# Patient Record
Sex: FEMALE | Race: WHITE | NOT HISPANIC OR LATINO | Employment: UNEMPLOYED | URBAN - METROPOLITAN AREA
[De-identification: names, ages, dates, MRNs, and addresses within clinical notes are randomized per-mention and may not be internally consistent; named-entity substitution may affect disease eponyms.]

---

## 2017-02-22 ENCOUNTER — HOSPITAL ENCOUNTER (OUTPATIENT)
Dept: FAMILY MEDICINE CLINIC | Facility: CLINIC | Age: 42
Setting detail: SPECIMEN
Discharge: HOME | End: 2017-02-22
Attending: FAMILY MEDICINE | Admitting: FAMILY MEDICINE

## 2017-02-22 LAB
ALBUMIN SERPL-MCNC: 3.9 G/DL (ref 3.5–4.8)
ALBUMIN/GLOB SERPL: 1.4 {RATIO} (ref 1–1.7)
ALP SERPL-CCNC: 90 IU/L (ref 32–91)
ALT SERPL-CCNC: 19 IU/L (ref 14–54)
ANION GAP SERPL CALC-SCNC: 12.6 MMOL/L (ref 10–20)
AST SERPL-CCNC: 25 IU/L (ref 15–41)
BASOPHILS # BLD AUTO: 0.1 10*3/UL (ref 0–0.2)
BASOPHILS NFR BLD AUTO: 1 % (ref 0–2)
BILIRUB SERPL-MCNC: 0.9 MG/DL (ref 0.3–1.2)
BUN SERPL-MCNC: 5 MG/DL (ref 8–20)
BUN/CREAT SERPL: 5.6 (ref 5.4–26.2)
CALCIUM SERPL-MCNC: 9.2 MG/DL (ref 8.9–10.3)
CHLORIDE SERPL-SCNC: 106 MMOL/L (ref 101–111)
CHOLEST SERPL-MCNC: 196 MG/DL
CHOLEST/HDLC SERPL: 2.3 {RATIO}
CONV CO2: 26 MMOL/L (ref 22–32)
CONV LDL CHOLESTEROL DIRECT: 91 MG/DL (ref 0–100)
CONV TOTAL PROTEIN: 6.7 G/DL (ref 6.1–7.9)
CREAT UR-MCNC: 0.9 MG/DL (ref 0.4–1)
DIFFERENTIAL METHOD BLD: (no result)
EOSINOPHIL # BLD AUTO: 0.3 10*3/UL (ref 0–0.3)
EOSINOPHIL # BLD AUTO: 4 % (ref 0–3)
ERYTHROCYTE [DISTWIDTH] IN BLOOD BY AUTOMATED COUNT: 13.4 % (ref 11.5–14.5)
GLOBULIN UR ELPH-MCNC: 2.8 G/DL (ref 2.5–3.8)
GLUCOSE SERPL-MCNC: 89 MG/DL (ref 65–99)
HCT VFR BLD AUTO: 38.6 % (ref 35–49)
HDLC SERPL-MCNC: 87 MG/DL
HGB BLD-MCNC: 12.9 G/DL (ref 12–15)
LDLC/HDLC SERPL: 1 {RATIO}
LIPID INTERPRETATION: NORMAL
LYMPHOCYTES # BLD AUTO: 2.2 10*3/UL (ref 0.8–4.8)
LYMPHOCYTES NFR BLD AUTO: 32 % (ref 18–42)
MCH RBC QN AUTO: 29.8 PG (ref 26–32)
MCHC RBC AUTO-ENTMCNC: 33.4 G/DL (ref 32–36)
MCV RBC AUTO: 89.2 FL (ref 80–94)
MONOCYTES # BLD AUTO: 0.5 10*3/UL (ref 0.1–1.3)
MONOCYTES NFR BLD AUTO: 7 % (ref 2–11)
NEUTROPHILS # BLD AUTO: 3.8 10*3/UL (ref 2.3–8.6)
NEUTROPHILS NFR BLD AUTO: 56 % (ref 50–75)
NRBC BLD AUTO-RTO: 0 /100{WBCS}
NRBC/RBC NFR BLD MANUAL: 0 10*3/UL
PLATELET # BLD AUTO: 226 10*3/UL (ref 150–450)
PMV BLD AUTO: 8.3 FL (ref 7.4–10.4)
POTASSIUM SERPL-SCNC: 3.6 MMOL/L (ref 3.6–5.1)
RBC # BLD AUTO: 4.33 10*6/UL (ref 4–5.4)
SODIUM SERPL-SCNC: 141 MMOL/L (ref 136–144)
TRIGL SERPL-MCNC: 36 MG/DL
TSH SERPL-ACNC: 1.42 UIU/ML (ref 0.34–5.6)
VLDLC SERPL CALC-MCNC: 17.7 MG/DL
WBC # BLD AUTO: 6.8 10*3/UL (ref 4.5–11.5)

## 2017-02-24 LAB
ANTI-CARDIOLIPIN IGG ANTIBODY: <14 GPL
ANTI-CARDIOLIPIN IGM ANTIBODY: <12 MPL

## 2017-09-06 ENCOUNTER — HOSPITAL ENCOUNTER (OUTPATIENT)
Dept: FAMILY MEDICINE CLINIC | Facility: CLINIC | Age: 42
Setting detail: SPECIMEN
Discharge: HOME | End: 2017-09-06
Attending: FAMILY MEDICINE | Admitting: FAMILY MEDICINE

## 2019-06-20 DIAGNOSIS — F41.9 ANXIETY: Primary | ICD-10-CM

## 2019-06-20 RX ORDER — ESCITALOPRAM OXALATE 20 MG/1
TABLET ORAL EVERY 24 HOURS
COMMUNITY
Start: 2017-11-20 | End: 2019-06-20 | Stop reason: SDUPTHER

## 2019-06-20 RX ORDER — ESCITALOPRAM OXALATE 20 MG/1
20 TABLET ORAL DAILY
Qty: 90 TABLET | Refills: 2 | Status: SHIPPED | OUTPATIENT
Start: 2019-06-20 | End: 2020-03-16

## 2020-03-16 DIAGNOSIS — F41.9 ANXIETY: ICD-10-CM

## 2020-03-16 RX ORDER — ESCITALOPRAM OXALATE 20 MG/1
TABLET ORAL
Qty: 90 TABLET | Refills: 1 | Status: SHIPPED | OUTPATIENT
Start: 2020-03-16 | End: 2020-09-14 | Stop reason: SDUPTHER

## 2020-09-14 DIAGNOSIS — F41.9 ANXIETY: ICD-10-CM

## 2020-09-14 RX ORDER — ESCITALOPRAM OXALATE 20 MG/1
20 TABLET ORAL DAILY
Qty: 30 TABLET | Refills: 1 | Status: SHIPPED | OUTPATIENT
Start: 2020-09-14 | End: 2021-01-15 | Stop reason: SDUPTHER

## 2020-11-10 ENCOUNTER — OFFICE VISIT (OUTPATIENT)
Dept: FAMILY MEDICINE CLINIC | Facility: CLINIC | Age: 45
End: 2020-11-10

## 2020-11-10 VITALS
OXYGEN SATURATION: 99 % | SYSTOLIC BLOOD PRESSURE: 129 MMHG | HEART RATE: 77 BPM | BODY MASS INDEX: 32.08 KG/M2 | DIASTOLIC BLOOD PRESSURE: 88 MMHG | TEMPERATURE: 97.7 F | WEIGHT: 167 LBS

## 2020-11-10 DIAGNOSIS — F41.9 ANXIETY: Primary | ICD-10-CM

## 2020-11-10 DIAGNOSIS — Z23 IMMUNIZATION DUE: ICD-10-CM

## 2020-11-10 PROCEDURE — 90686 IIV4 VACC NO PRSV 0.5 ML IM: CPT | Performed by: FAMILY MEDICINE

## 2020-11-10 PROCEDURE — 90471 IMMUNIZATION ADMIN: CPT | Performed by: FAMILY MEDICINE

## 2020-11-10 PROCEDURE — 99213 OFFICE O/P EST LOW 20 MIN: CPT | Performed by: FAMILY MEDICINE

## 2020-11-10 NOTE — PROGRESS NOTES
Subjective   Chika Valente is a 45 y.o. female.     She is in today for follow-up on anxiety.  She has been on Lexapro for quite some time and has been overdue for a follow-up.  She has remained stable on current treatment plan.  She feels she is in a good place, but she is not ready to lower her dose or try coming off the medication.  She feels she has her anxiety under good control.  She denies any suicidal ideations.  She denies any issue with chest pain or palpitations.  She denies any change in bowel or bladder function.  She denies any issue with sleep.  She says she is functional with all ADLs.  She has gained a little weight but nothing excessive of late.         /88 (BP Location: Left arm, Patient Position: Sitting, Cuff Size: Large Adult)   Pulse 77   Temp 97.7 °F (36.5 °C) (Temporal)   Wt 75.8 kg (167 lb)   SpO2 99%   BMI 32.08 kg/m²       Chief Complaint   Patient presents with   • Med Refill     last seen 03/2019           Current Outpatient Medications:   •  escitalopram (LEXAPRO) 20 MG tablet, Take 1 tablet by mouth Daily., Disp: 30 tablet, Rfl: 1        The following portions of the patient's history were reviewed and updated as appropriate: allergies, current medications, past family history, past medical history, past social history, past surgical history and problem list.    Review of Systems   Constitutional: Negative for activity change, fatigue and fever.   HENT: Negative for congestion, sinus pressure, sinus pain, sore throat and trouble swallowing.    Eyes: Negative for visual disturbance.   Respiratory: Negative for chest tightness, shortness of breath and wheezing.    Cardiovascular: Negative for chest pain.   Gastrointestinal: Negative for abdominal distention, abdominal pain, constipation, diarrhea, nausea and vomiting.   Genitourinary: Negative for difficulty urinating, dysuria, frequency and urgency.   Musculoskeletal: Negative for back pain and neck pain.   Neurological:  Negative for dizziness, weakness, light-headedness and numbness.   Psychiatric/Behavioral: Negative for agitation, dysphoric mood, hallucinations, sleep disturbance and suicidal ideas.       Objective   Physical Exam  Vitals signs and nursing note reviewed.   Neck:      Musculoskeletal: Neck supple.   Cardiovascular:      Rate and Rhythm: Normal rate and regular rhythm.      Heart sounds: Normal heart sounds. No murmur.   Pulmonary:      Effort: Pulmonary effort is normal.      Breath sounds: Normal breath sounds. No wheezing or rales.   Abdominal:      General: Bowel sounds are normal.      Palpations: Abdomen is soft.      Tenderness: There is no abdominal tenderness. There is no guarding.   Lymphadenopathy:      Cervical: No cervical adenopathy.   Neurological:      General: No focal deficit present.      Mental Status: She is alert and oriented to person, place, and time.   Psychiatric:         Mood and Affect: Mood normal.           Assessment/Plan   Problems Addressed this Visit     None      Visit Diagnoses     Anxiety    -  Primary      Diagnoses       Codes Comments    Anxiety    -  Primary ICD-10-CM: F41.9  ICD-9-CM: 300.00         I will continue her current treatment  I will update flu vaccine  I will see her back as needed

## 2021-01-15 DIAGNOSIS — F41.9 ANXIETY: ICD-10-CM

## 2021-01-15 RX ORDER — ESCITALOPRAM OXALATE 20 MG/1
20 TABLET ORAL DAILY
Qty: 30 TABLET | Refills: 3 | Status: SHIPPED | OUTPATIENT
Start: 2021-01-15 | End: 2021-01-20 | Stop reason: SDUPTHER

## 2021-01-20 ENCOUNTER — TELEPHONE (OUTPATIENT)
Dept: FAMILY MEDICINE CLINIC | Facility: CLINIC | Age: 46
End: 2021-01-20

## 2021-01-20 DIAGNOSIS — F41.9 ANXIETY: ICD-10-CM

## 2021-01-20 RX ORDER — ESCITALOPRAM OXALATE 20 MG/1
20 TABLET ORAL DAILY
Qty: 10 TABLET | Refills: 0 | Status: SHIPPED | OUTPATIENT
Start: 2021-01-20 | End: 2021-06-23

## 2021-06-23 DIAGNOSIS — F41.9 ANXIETY: ICD-10-CM

## 2021-06-23 RX ORDER — ESCITALOPRAM OXALATE 20 MG/1
TABLET ORAL
Qty: 30 TABLET | Refills: 3 | Status: SHIPPED | OUTPATIENT
Start: 2021-06-23 | End: 2021-06-24 | Stop reason: SDUPTHER

## 2021-06-24 ENCOUNTER — TELEPHONE (OUTPATIENT)
Dept: FAMILY MEDICINE CLINIC | Facility: CLINIC | Age: 46
End: 2021-06-24

## 2021-06-24 DIAGNOSIS — F41.9 ANXIETY: ICD-10-CM

## 2021-06-24 RX ORDER — ESCITALOPRAM OXALATE 20 MG/1
20 TABLET ORAL DAILY
Qty: 30 TABLET | Refills: 0 | Status: SHIPPED | OUTPATIENT
Start: 2021-06-24 | End: 2021-07-17

## 2021-06-24 NOTE — TELEPHONE ENCOUNTER
Caller: Ambrosio Chika A    Relationship: Self    Best call back number:[phone] (H)    Medication needed:   Requested Prescriptions     Pending Prescriptions Disp Refills   • escitalopram (LEXAPRO) 20 MG tablet 30 tablet 3     Sig: Take 1 tablet by mouth Daily.       When do you need the refill by: AS SOON AS POSSIBLE    What additional details did the patient provide when requesting the medication: PATIENT STATES SHE NEEDS A WEEK SUPPLY OF HER MEDICATION. STATES SHE WAITED TO LONG TO CALL IT INTO [facility], AND IS COMPLETLEY OUT.     Does the patient have less than a 3 day supply:  [x] Yes  [] No    What is the patient's preferred pharmacy: [facility] [address] [address] - [phone] [facility] [phone]

## 2021-07-17 DIAGNOSIS — F41.9 ANXIETY: ICD-10-CM

## 2021-07-17 RX ORDER — ESCITALOPRAM OXALATE 20 MG/1
TABLET ORAL
Qty: 30 TABLET | Refills: 0 | Status: SHIPPED | OUTPATIENT
Start: 2021-07-17 | End: 2021-08-09 | Stop reason: SDUPTHER

## 2021-08-09 DIAGNOSIS — F41.9 ANXIETY: ICD-10-CM

## 2021-08-09 RX ORDER — ESCITALOPRAM OXALATE 20 MG/1
20 TABLET ORAL DAILY
Qty: 90 TABLET | Refills: 0 | Status: SHIPPED | OUTPATIENT
Start: 2021-08-09 | End: 2021-11-15 | Stop reason: SDUPTHER

## 2021-11-15 DIAGNOSIS — F41.9 ANXIETY: ICD-10-CM

## 2021-11-15 RX ORDER — ESCITALOPRAM OXALATE 20 MG/1
20 TABLET ORAL DAILY
Qty: 90 TABLET | Refills: 0 | Status: SHIPPED | OUTPATIENT
Start: 2021-11-15 | End: 2022-05-11

## 2021-11-15 NOTE — TELEPHONE ENCOUNTER
Caller: Chika Valente    Relationship: Self    Best call back number: [phone] (H)    Requested Prescriptions:   Requested Prescriptions     Pending Prescriptions Disp Refills   • escitalopram (LEXAPRO) 20 MG tablet 90 tablet 0     Sig: Take 1 tablet by mouth Daily.        Pharmacy where request should be sent:  [facility] - [address] - [phone] [facility] [phone] [phone]    Additional details provided by patient: PATIENT HAS 10 DAYS LEFT     Does the patient have less than a 3 day supply:  [] Yes  [x] No    Joanna Alas, PCT   11/15/21 13:36 EST

## 2022-05-11 DIAGNOSIS — F41.9 ANXIETY: ICD-10-CM

## 2022-05-11 RX ORDER — ESCITALOPRAM OXALATE 20 MG/1
TABLET ORAL
Qty: 90 TABLET | Refills: 0 | Status: SHIPPED | OUTPATIENT
Start: 2022-05-11 | End: 2022-06-06 | Stop reason: SDUPTHER

## 2022-06-06 ENCOUNTER — LAB (OUTPATIENT)
Dept: FAMILY MEDICINE CLINIC | Facility: CLINIC | Age: 47
End: 2022-06-06

## 2022-06-06 ENCOUNTER — OFFICE VISIT (OUTPATIENT)
Dept: FAMILY MEDICINE CLINIC | Facility: CLINIC | Age: 47
End: 2022-06-06

## 2022-06-06 VITALS
TEMPERATURE: 97.7 F | DIASTOLIC BLOOD PRESSURE: 92 MMHG | OXYGEN SATURATION: 100 % | HEIGHT: 61 IN | WEIGHT: 161 LBS | SYSTOLIC BLOOD PRESSURE: 144 MMHG | RESPIRATION RATE: 16 BRPM | HEART RATE: 72 BPM | BODY MASS INDEX: 30.4 KG/M2

## 2022-06-06 DIAGNOSIS — F41.9 ANXIETY: Primary | ICD-10-CM

## 2022-06-06 DIAGNOSIS — Z12.11 ENCOUNTER FOR SCREENING COLONOSCOPY: ICD-10-CM

## 2022-06-06 DIAGNOSIS — Z13.1 SCREENING FOR DIABETES MELLITUS: ICD-10-CM

## 2022-06-06 DIAGNOSIS — Z13.220 SCREENING CHOLESTEROL LEVEL: ICD-10-CM

## 2022-06-06 PROBLEM — K21.9 GASTROESOPHAGEAL REFLUX DISEASE: Status: ACTIVE | Noted: 2019-03-20

## 2022-06-06 PROBLEM — E78.5 DYSLIPIDEMIA: Status: ACTIVE | Noted: 2017-09-06

## 2022-06-06 LAB
ALBUMIN SERPL-MCNC: 4 G/DL (ref 3.5–5.2)
ALBUMIN/GLOB SERPL: 1.3 G/DL
ALP SERPL-CCNC: 93 U/L (ref 39–117)
ALT SERPL W P-5'-P-CCNC: 8 U/L (ref 1–33)
ANION GAP SERPL CALCULATED.3IONS-SCNC: 10.4 MMOL/L (ref 5–15)
AST SERPL-CCNC: 12 U/L (ref 1–32)
BASOPHILS # BLD AUTO: 0.08 10*3/MM3 (ref 0–0.2)
BASOPHILS NFR BLD AUTO: 1.3 % (ref 0–1.5)
BILIRUB SERPL-MCNC: 0.5 MG/DL (ref 0–1.2)
BUN SERPL-MCNC: 12 MG/DL (ref 6–20)
BUN/CREAT SERPL: 15.8 (ref 7–25)
CALCIUM SPEC-SCNC: 9 MG/DL (ref 8.6–10.5)
CHLORIDE SERPL-SCNC: 105 MMOL/L (ref 98–107)
CHOLEST SERPL-MCNC: 192 MG/DL (ref 0–200)
CO2 SERPL-SCNC: 25.6 MMOL/L (ref 22–29)
CREAT SERPL-MCNC: 0.76 MG/DL (ref 0.57–1)
DEPRECATED RDW RBC AUTO: 41.4 FL (ref 37–54)
EGFRCR SERPLBLD CKD-EPI 2021: 98 ML/MIN/1.73
EOSINOPHIL # BLD AUTO: 0.26 10*3/MM3 (ref 0–0.4)
EOSINOPHIL NFR BLD AUTO: 4.3 % (ref 0.3–6.2)
ERYTHROCYTE [DISTWIDTH] IN BLOOD BY AUTOMATED COUNT: 12.7 % (ref 12.3–15.4)
GLOBULIN UR ELPH-MCNC: 3.2 GM/DL
GLUCOSE SERPL-MCNC: 100 MG/DL (ref 65–99)
HCT VFR BLD AUTO: 37.8 % (ref 34–46.6)
HDLC SERPL-MCNC: 83 MG/DL (ref 40–60)
HGB BLD-MCNC: 12.1 G/DL (ref 12–15.9)
IMM GRANULOCYTES # BLD AUTO: 0.01 10*3/MM3 (ref 0–0.05)
IMM GRANULOCYTES NFR BLD AUTO: 0.2 % (ref 0–0.5)
LDLC SERPL CALC-MCNC: 103 MG/DL (ref 0–100)
LDLC/HDLC SERPL: 1.25 {RATIO}
LYMPHOCYTES # BLD AUTO: 1.8 10*3/MM3 (ref 0.7–3.1)
LYMPHOCYTES NFR BLD AUTO: 30.1 % (ref 19.6–45.3)
MCH RBC QN AUTO: 28.7 PG (ref 26.6–33)
MCHC RBC AUTO-ENTMCNC: 32 G/DL (ref 31.5–35.7)
MCV RBC AUTO: 89.8 FL (ref 79–97)
MONOCYTES # BLD AUTO: 0.41 10*3/MM3 (ref 0.1–0.9)
MONOCYTES NFR BLD AUTO: 6.8 % (ref 5–12)
NEUTROPHILS NFR BLD AUTO: 3.43 10*3/MM3 (ref 1.7–7)
NEUTROPHILS NFR BLD AUTO: 57.3 % (ref 42.7–76)
NRBC BLD AUTO-RTO: 0 /100 WBC (ref 0–0.2)
PLATELET # BLD AUTO: 232 10*3/MM3 (ref 140–450)
PMV BLD AUTO: 9.9 FL (ref 6–12)
POTASSIUM SERPL-SCNC: 4.3 MMOL/L (ref 3.5–5.2)
PROT SERPL-MCNC: 7.2 G/DL (ref 6–8.5)
RBC # BLD AUTO: 4.21 10*6/MM3 (ref 3.77–5.28)
SODIUM SERPL-SCNC: 141 MMOL/L (ref 136–145)
TRIGL SERPL-MCNC: 25 MG/DL (ref 0–150)
TSH SERPL DL<=0.05 MIU/L-ACNC: 2.11 UIU/ML (ref 0.27–4.2)
VLDLC SERPL-MCNC: 6 MG/DL (ref 5–40)
WBC NRBC COR # BLD: 5.99 10*3/MM3 (ref 3.4–10.8)

## 2022-06-06 PROCEDURE — 36415 COLL VENOUS BLD VENIPUNCTURE: CPT | Performed by: PHYSICIAN ASSISTANT

## 2022-06-06 PROCEDURE — 80050 GENERAL HEALTH PANEL: CPT | Performed by: PHYSICIAN ASSISTANT

## 2022-06-06 PROCEDURE — 80061 LIPID PANEL: CPT | Performed by: PHYSICIAN ASSISTANT

## 2022-06-06 PROCEDURE — 99213 OFFICE O/P EST LOW 20 MIN: CPT | Performed by: PHYSICIAN ASSISTANT

## 2022-06-06 RX ORDER — ESCITALOPRAM OXALATE 20 MG/1
20 TABLET ORAL DAILY
Qty: 90 TABLET | Refills: 3 | Status: SHIPPED | OUTPATIENT
Start: 2022-06-06

## 2022-06-06 NOTE — PROGRESS NOTES
Subjective   Chika Valente is a 47 y.o. female.     Pt presents to follow up on her anxiety and for refill of her Lexapro.  She is getting annual mammograms and last one was in February.  Dr. Butler is ob/gyn.         The following portions of the patient's history were reviewed and updated as appropriate: allergies, current medications, past family history, past medical history, past social history, past surgical history and problem list.  Past Medical History:   Diagnosis Date   • Anxiety      No past surgical history on file.  No family history on file.  Social History     Socioeconomic History   • Marital status:    Tobacco Use   • Smoking status: Never Smoker   • Smokeless tobacco: Never Used   Substance and Sexual Activity   • Alcohol use: Yes   • Drug use: Never   • Sexual activity: Defer         Current Outpatient Medications:   •  escitalopram (LEXAPRO) 20 MG tablet, Take 1 tablet by mouth Daily., Disp: 90 tablet, Rfl: 3    Review of Systems   Constitutional: Negative for activity change, appetite change, chills, diaphoresis, fatigue, fever, unexpected weight gain and unexpected weight loss.   Eyes: Negative for blurred vision, double vision and visual disturbance.   Respiratory: Negative for chest tightness and shortness of breath.    Cardiovascular: Negative for chest pain, palpitations and leg swelling.   Gastrointestinal: Negative for abdominal pain, constipation, diarrhea, nausea and vomiting.   Genitourinary: Negative for menstrual problem.   Musculoskeletal: Positive for arthralgias. Negative for gait problem.   Neurological: Negative for dizziness, tremors, syncope, weakness, light-headedness and confusion.   Psychiatric/Behavioral: Positive for stress. Negative for decreased concentration, dysphoric mood, sleep disturbance and depressed mood. The patient is not nervous/anxious.      /92 (BP Location: Left arm, Patient Position: Sitting, Cuff Size: Adult)   Pulse 72   Temp 97.7 °F  "(36.5 °C) (Temporal)   Resp 16   Ht 153.7 cm (60.5\")   Wt 73 kg (161 lb)   LMP 05/30/2022 (Exact Date)   SpO2 100%   Breastfeeding No   BMI 30.93 kg/m²       Objective   Physical Exam  Vitals and nursing note reviewed.   Constitutional:       Appearance: Normal appearance. She is normal weight.   Neck:      Thyroid: No thyroid mass, thyromegaly or thyroid tenderness.      Vascular: No carotid bruit.   Cardiovascular:      Rate and Rhythm: Normal rate and regular rhythm.      Heart sounds: Normal heart sounds.   Pulmonary:      Effort: Pulmonary effort is normal.      Breath sounds: Normal breath sounds.   Musculoskeletal:         General: Normal range of motion.      Cervical back: Normal range of motion and neck supple.      Right lower leg: No edema.      Left lower leg: No edema.   Skin:     General: Skin is warm and dry.   Neurological:      General: No focal deficit present.      Mental Status: She is alert and oriented to person, place, and time.   Psychiatric:         Mood and Affect: Mood normal.         Behavior: Behavior normal.         Thought Content: Thought content normal.         Procedures       Diagnoses and all orders for this visit:    1. Anxiety (Primary)  Comments:  Pt doing well with current regimen.  Lexapro refilled today.  Orders:  -     CBC & Differential  -     escitalopram (LEXAPRO) 20 MG tablet; Take 1 tablet by mouth Daily.  Dispense: 90 tablet; Refill: 3  -     TSH    2. Screening cholesterol level  Comments:  Pt to get fasting labs done today and will call with results.  Orders:  -     Lipid Panel    3. Screening for diabetes mellitus  Comments:  Pt to get fasting labs done today and will call with results.  Orders:  -     Comprehensive Metabolic Panel    4. Encounter for screening colonoscopy  Comments:  Pt to schedule her colonoscopy.  Orders:  -     Ambulatory Referral For Screening Colonoscopy               "

## 2023-07-25 DIAGNOSIS — F41.9 ANXIETY: ICD-10-CM

## 2023-07-25 RX ORDER — ESCITALOPRAM OXALATE 20 MG/1
TABLET ORAL
Qty: 90 TABLET | Refills: 0 | Status: SHIPPED | OUTPATIENT
Start: 2023-07-25

## 2023-10-23 DIAGNOSIS — F41.9 ANXIETY: ICD-10-CM

## 2023-10-23 RX ORDER — ESCITALOPRAM OXALATE 20 MG/1
TABLET ORAL
Qty: 90 TABLET | Refills: 0 | Status: SHIPPED | OUTPATIENT
Start: 2023-10-23

## 2024-01-22 DIAGNOSIS — F41.9 ANXIETY: ICD-10-CM

## 2024-01-22 RX ORDER — ESCITALOPRAM OXALATE 20 MG/1
TABLET ORAL
Qty: 90 TABLET | Refills: 0 | Status: SHIPPED | OUTPATIENT
Start: 2024-01-22

## 2024-03-22 ENCOUNTER — OFFICE VISIT (OUTPATIENT)
Dept: FAMILY MEDICINE CLINIC | Facility: CLINIC | Age: 49
End: 2024-03-22
Payer: COMMERCIAL

## 2024-03-22 VITALS
HEART RATE: 81 BPM | RESPIRATION RATE: 18 BRPM | WEIGHT: 186.2 LBS | OXYGEN SATURATION: 99 % | TEMPERATURE: 98.4 F | BODY MASS INDEX: 34.27 KG/M2 | SYSTOLIC BLOOD PRESSURE: 126 MMHG | DIASTOLIC BLOOD PRESSURE: 88 MMHG | HEIGHT: 62 IN

## 2024-03-22 DIAGNOSIS — K21.9 GASTROESOPHAGEAL REFLUX DISEASE WITHOUT ESOPHAGITIS: Primary | ICD-10-CM

## 2024-03-22 DIAGNOSIS — Z76.89 ENCOUNTER TO ESTABLISH CARE: ICD-10-CM

## 2024-03-22 DIAGNOSIS — R06.2 WHEEZING: ICD-10-CM

## 2024-03-22 DIAGNOSIS — F41.1 GENERALIZED ANXIETY DISORDER: ICD-10-CM

## 2024-03-22 DIAGNOSIS — Z12.11 ENCOUNTER FOR SCREENING COLONOSCOPY: ICD-10-CM

## 2024-03-22 RX ORDER — OMEPRAZOLE 20 MG/1
20 CAPSULE, DELAYED RELEASE ORAL DAILY
COMMUNITY
End: 2024-03-22

## 2024-03-22 RX ORDER — OMEPRAZOLE 20 MG/1
20 CAPSULE, DELAYED RELEASE ORAL DAILY
Qty: 90 CAPSULE | Refills: 1 | Status: SHIPPED | OUTPATIENT
Start: 2024-03-22

## 2024-03-22 RX ORDER — MONTELUKAST SODIUM 10 MG/1
10 TABLET ORAL NIGHTLY
Qty: 90 TABLET | Refills: 0 | Status: SHIPPED | OUTPATIENT
Start: 2024-03-22

## 2024-03-22 RX ORDER — ALBUTEROL SULFATE 90 UG/1
2 AEROSOL, METERED RESPIRATORY (INHALATION) EVERY 4 HOURS PRN
Qty: 18 G | Refills: 2 | Status: SHIPPED | OUTPATIENT
Start: 2024-03-22

## 2024-03-22 RX ORDER — LORATADINE 10 MG/1
10 CAPSULE, LIQUID FILLED ORAL DAILY
COMMUNITY
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
## 2024-04-22 DIAGNOSIS — F41.9 ANXIETY: ICD-10-CM

## 2024-04-22 RX ORDER — ESCITALOPRAM OXALATE 20 MG/1
TABLET ORAL
Qty: 90 TABLET | Refills: 3 | OUTPATIENT
Start: 2024-04-22

## 2024-04-26 ENCOUNTER — CLINICAL SUPPORT (OUTPATIENT)
Dept: FAMILY MEDICINE CLINIC | Facility: CLINIC | Age: 49
End: 2024-04-26
Payer: COMMERCIAL

## 2024-04-26 ENCOUNTER — PREP FOR SURGERY (OUTPATIENT)
Dept: OTHER | Facility: HOSPITAL | Age: 49
End: 2024-04-26

## 2024-04-26 DIAGNOSIS — Z12.11 ENCOUNTER FOR SCREENING COLONOSCOPY: Primary | ICD-10-CM

## 2024-04-26 DIAGNOSIS — Z76.89 ENCOUNTER TO ESTABLISH CARE: ICD-10-CM

## 2024-04-26 LAB — TSH SERPL DL<=0.05 MIU/L-ACNC: 3.62 UIU/ML (ref 0.27–4.2)

## 2024-04-26 PROCEDURE — 80061 LIPID PANEL: CPT | Performed by: NURSE PRACTITIONER

## 2024-04-26 PROCEDURE — 83036 HEMOGLOBIN GLYCOSYLATED A1C: CPT | Performed by: NURSE PRACTITIONER

## 2024-04-26 PROCEDURE — 81001 URINALYSIS AUTO W/SCOPE: CPT | Performed by: NURSE PRACTITIONER

## 2024-04-26 PROCEDURE — 80050 GENERAL HEALTH PANEL: CPT | Performed by: NURSE PRACTITIONER

## 2024-04-26 NOTE — PROGRESS NOTES
Venipuncture Blood Specimen Collection  Venipuncture performed in the right arm by Brenda Hodgson MA with good hemostasis. Patient tolerated the procedure well without complications.   04/26/24   Brenda Hodgson MA

## 2024-04-27 LAB
ALBUMIN SERPL-MCNC: 4.2 G/DL (ref 3.5–5.2)
ALBUMIN/GLOB SERPL: 1.4 G/DL
ALP SERPL-CCNC: 109 U/L (ref 39–117)
ALT SERPL W P-5'-P-CCNC: 12 U/L (ref 1–33)
ANION GAP SERPL CALCULATED.3IONS-SCNC: 9 MMOL/L (ref 5–15)
AST SERPL-CCNC: 12 U/L (ref 1–32)
BACTERIA UR QL AUTO: ABNORMAL /HPF
BASOPHILS # BLD AUTO: 0.08 10*3/MM3 (ref 0–0.2)
BASOPHILS NFR BLD AUTO: 1 % (ref 0–1.5)
BILIRUB SERPL-MCNC: 0.5 MG/DL (ref 0–1.2)
BILIRUB UR QL STRIP: NEGATIVE
BUN SERPL-MCNC: 9 MG/DL (ref 6–20)
BUN/CREAT SERPL: 10.8 (ref 7–25)
CALCIUM SPEC-SCNC: 9 MG/DL (ref 8.6–10.5)
CHLORIDE SERPL-SCNC: 105 MMOL/L (ref 98–107)
CHOLEST SERPL-MCNC: 189 MG/DL (ref 0–200)
CLARITY UR: CLEAR
CO2 SERPL-SCNC: 26 MMOL/L (ref 22–29)
COLOR UR: YELLOW
CREAT SERPL-MCNC: 0.83 MG/DL (ref 0.57–1)
DEPRECATED RDW RBC AUTO: 43.4 FL (ref 37–54)
EGFRCR SERPLBLD CKD-EPI 2021: 87.1 ML/MIN/1.73
EOSINOPHIL # BLD AUTO: 0.22 10*3/MM3 (ref 0–0.4)
EOSINOPHIL NFR BLD AUTO: 2.8 % (ref 0.3–6.2)
ERYTHROCYTE [DISTWIDTH] IN BLOOD BY AUTOMATED COUNT: 15.3 % (ref 12.3–15.4)
GLOBULIN UR ELPH-MCNC: 3.1 GM/DL
GLUCOSE SERPL-MCNC: 92 MG/DL (ref 65–99)
GLUCOSE UR STRIP-MCNC: NEGATIVE MG/DL
HBA1C MFR BLD: 5.1 % (ref 4.8–5.6)
HCT VFR BLD AUTO: 35.4 % (ref 34–46.6)
HDLC SERPL-MCNC: 67 MG/DL (ref 40–60)
HGB BLD-MCNC: 10.7 G/DL (ref 12–15.9)
HGB UR QL STRIP.AUTO: NEGATIVE
HOLD SPECIMEN: NORMAL
HYALINE CASTS UR QL AUTO: ABNORMAL /LPF
IMM GRANULOCYTES # BLD AUTO: 0.02 10*3/MM3 (ref 0–0.05)
IMM GRANULOCYTES NFR BLD AUTO: 0.3 % (ref 0–0.5)
KETONES UR QL STRIP: NEGATIVE
LDLC SERPL CALC-MCNC: 114 MG/DL (ref 0–100)
LDLC/HDLC SERPL: 1.7 {RATIO}
LEUKOCYTE ESTERASE UR QL STRIP.AUTO: ABNORMAL
LYMPHOCYTES # BLD AUTO: 2.62 10*3/MM3 (ref 0.7–3.1)
LYMPHOCYTES NFR BLD AUTO: 33.5 % (ref 19.6–45.3)
MCH RBC QN AUTO: 23.9 PG (ref 26.6–33)
MCHC RBC AUTO-ENTMCNC: 30.2 G/DL (ref 31.5–35.7)
MCV RBC AUTO: 79 FL (ref 79–97)
MONOCYTES # BLD AUTO: 0.55 10*3/MM3 (ref 0.1–0.9)
MONOCYTES NFR BLD AUTO: 7 % (ref 5–12)
NEUTROPHILS NFR BLD AUTO: 4.33 10*3/MM3 (ref 1.7–7)
NEUTROPHILS NFR BLD AUTO: 55.4 % (ref 42.7–76)
NITRITE UR QL STRIP: NEGATIVE
NRBC BLD AUTO-RTO: 0 /100 WBC (ref 0–0.2)
PH UR STRIP.AUTO: 6 [PH] (ref 5–8)
PLATELET # BLD AUTO: 342 10*3/MM3 (ref 140–450)
PMV BLD AUTO: 10.3 FL (ref 6–12)
POTASSIUM SERPL-SCNC: 4.3 MMOL/L (ref 3.5–5.2)
PROT SERPL-MCNC: 7.3 G/DL (ref 6–8.5)
PROT UR QL STRIP: ABNORMAL
RBC # BLD AUTO: 4.48 10*6/MM3 (ref 3.77–5.28)
RBC # UR STRIP: ABNORMAL /HPF
REF LAB TEST METHOD: ABNORMAL
SODIUM SERPL-SCNC: 140 MMOL/L (ref 136–145)
SP GR UR STRIP: 1.02 (ref 1–1.03)
SQUAMOUS #/AREA URNS HPF: ABNORMAL /HPF
TRIGL SERPL-MCNC: 42 MG/DL (ref 0–150)
UROBILINOGEN UR QL STRIP: ABNORMAL
VLDLC SERPL-MCNC: 8 MG/DL (ref 5–40)
WBC # UR STRIP: ABNORMAL /HPF
WBC NRBC COR # BLD AUTO: 7.82 10*3/MM3 (ref 3.4–10.8)
YEAST URNS QL MICRO: PRESENT /HPF

## 2024-04-27 RX ORDER — SODIUM CHLORIDE, SODIUM LACTATE, POTASSIUM CHLORIDE, CALCIUM CHLORIDE 600; 310; 30; 20 MG/100ML; MG/100ML; MG/100ML; MG/100ML
30 INJECTION, SOLUTION INTRAVENOUS CONTINUOUS
OUTPATIENT
Start: 2024-04-27

## 2024-04-27 RX ORDER — SODIUM, POTASSIUM,MAG SULFATES 17.5-3.13G
SOLUTION, RECONSTITUTED, ORAL ORAL
Qty: 177 ML | Refills: 0 | Status: SHIPPED | OUTPATIENT
Start: 2024-04-27 | End: 2024-05-08 | Stop reason: SDUPTHER

## 2024-05-03 ENCOUNTER — OFFICE VISIT (OUTPATIENT)
Dept: FAMILY MEDICINE CLINIC | Facility: CLINIC | Age: 49
End: 2024-05-03
Payer: COMMERCIAL

## 2024-05-03 ENCOUNTER — TELEPHONE (OUTPATIENT)
Dept: FAMILY MEDICINE CLINIC | Facility: CLINIC | Age: 49
End: 2024-05-03
Payer: COMMERCIAL

## 2024-05-03 VITALS
DIASTOLIC BLOOD PRESSURE: 80 MMHG | SYSTOLIC BLOOD PRESSURE: 140 MMHG | HEIGHT: 62 IN | WEIGHT: 187 LBS | RESPIRATION RATE: 18 BRPM | OXYGEN SATURATION: 96 % | TEMPERATURE: 97.1 F | HEART RATE: 74 BPM | BODY MASS INDEX: 34.41 KG/M2

## 2024-05-03 DIAGNOSIS — J45.20 MILD INTERMITTENT ASTHMA WITHOUT COMPLICATION: ICD-10-CM

## 2024-05-03 DIAGNOSIS — F41.1 GENERALIZED ANXIETY DISORDER: Primary | ICD-10-CM

## 2024-05-03 DIAGNOSIS — R03.0 ELEVATED BLOOD PRESSURE READING IN OFFICE WITHOUT DIAGNOSIS OF HYPERTENSION: ICD-10-CM

## 2024-05-03 PROCEDURE — 99214 OFFICE O/P EST MOD 30 MIN: CPT | Performed by: NURSE PRACTITIONER

## 2024-05-03 RX ORDER — VILAZODONE HYDROCHLORIDE 20 MG/1
20 TABLET ORAL DAILY
Qty: 30 TABLET | Refills: 2 | Status: SHIPPED | OUTPATIENT
Start: 2024-05-03

## 2024-05-03 NOTE — ASSESSMENT & PLAN NOTE
Asthma is improving with treatment.  Continue Singulair 10 mg nightly.  Continue daily antihistamine.  Continue albuterol as needed.  The patient is experiencing no daytime asthma symptoms and she is experiencing no nighttime asthma symptoms.    Plan: Continue same medication/s without change.    Discussed medication dosage, use, side effects, and goals of treatment in detail.    Discussed monitoring symptoms and use of quick-relief medications and contacting provider early in the course of exacerbations.    Patient Treatment Goals: symptom prevention.    Followup in 3 months.

## 2024-05-03 NOTE — PROGRESS NOTES
"Chief Complaint  Follow-up    Subjective        Chika Valente presents to [facility]  History of Present Illness    Patient presents today to follow-up on new patient labs, GeneSight results and wheezing.    Wheezing: Status is improved. Current medication includes Singulair 10 mg QHS, Claritin 10 mg daily, and albuterol prn (using inhaler before morning walks). Negative for cough, nighttime cough, postnasal drainage, wheezing, and shortness of breath.      Objective   Vital Signs:  /80 (BP Location: Left arm, Patient Position: Sitting, Cuff Size: Adult)   Pulse 74   Temp 97.1 °F (36.2 °C)   Resp 18   Ht 157.5 cm (62\")   Wt 84.8 kg (187 lb)   SpO2 96%   BMI 34.20 kg/m²   Estimated body mass index is 34.2 kg/m² as calculated from the following:    Height as of this encounter: 157.5 cm (62\").    Weight as of this encounter: 84.8 kg (187 lb).             Physical Exam  Constitutional:       General: She is not in acute distress.     Appearance: She is obese.   Cardiovascular:      Rate and Rhythm: Normal rate and regular rhythm.      Chest Wall: PMI is not displaced.      Heart sounds: Normal heart sounds.   Pulmonary:      Effort: Pulmonary effort is normal.      Breath sounds: Normal breath sounds and air entry.   Musculoskeletal:      Right lower leg: No edema.      Left lower leg: No edema.   Skin:     General: Skin is warm and dry.   Neurological:      Mental Status: She is alert and oriented to person, place, and time.      Gait: Gait is intact.   Psychiatric:         Attention and Perception: Attention normal.         Mood and Affect: Mood normal.         Speech: Speech normal.         Behavior: Behavior normal.         Thought Content: Thought content normal.         Judgment: Judgment normal.        Result Review :      CMP          4/26/2024    08:09   CMP   Glucose 92    BUN 9    Creatinine 0.83    EGFR 87.1    Sodium 140    Potassium 4.3    Chloride 105    Calcium " 9.0    Total Protein 7.3    Albumin 4.2    Globulin 3.1    Total Bilirubin 0.5    Alkaline Phosphatase 109    AST (SGOT) 12    ALT (SGPT) 12    Albumin/Globulin Ratio 1.4    BUN/Creatinine Ratio 10.8    Anion Gap 9.0      CBC w/diff          4/26/2024    08:09   CBC w/Diff   WBC 7.82    RBC 4.48    Hemoglobin 10.7    Hematocrit 35.4    MCV 79.0    MCH 23.9    MCHC 30.2    RDW 15.3    Platelets 342    Neutrophil Rel % 55.4    Immature Granulocyte Rel % 0.3    Lymphocyte Rel % 33.5    Monocyte Rel % 7.0    Eosinophil Rel % 2.8    Basophil Rel % 1.0      Lipid Panel          4/26/2024    08:09   Lipid Panel   Total Cholesterol 189    Triglycerides 42    HDL Cholesterol 67    VLDL Cholesterol 8    LDL Cholesterol  114    LDL/HDL Ratio 1.70      TSH          4/26/2024    08:09   TSH   TSH 3.620      Most Recent A1C          4/26/2024    08:09   HGBA1C Most Recent   Hemoglobin A1C 5.10        Component  Ref Range & Units 7 d ago   RBC, UA  None Seen, 0-2 /HPF 0-2   WBC, UA  None Seen, 0-2 /HPF 0-2   Bacteria, UA  None Seen /HPF 3+ Abnormal    Squamous Epithelial Cells, UA  None Seen, 0-2 /HPF 0-2   Yeast, UA  None Seen /HPF Present   Hyaline Casts, UA  None Seen /LPF None Seen   Methodology Manual Light Microscopy   Resulting [facility]     Component  Ref Range & Units 7 d ago   Color, UA  Yellow, Straw Yellow   Appearance, UA  Clear Clear   pH, UA  5.0 - 8.0 6.0   Specific Gravity, UA  1.005 - 1.030 1.020   Glucose, UA  Negative Negative   Ketones, UA  Negative Negative   Bilirubin, UA  Negative Negative   Blood, UA  Negative Negative   Protein, UA  Negative Trace Abnormal    Leuk Esterase, UA  Negative Trace Abnormal    Nitrite, UA  Negative Negative   Urobilinogen, UA  0.2 - 1.0 E.U./dL 1.0 E.U./dL   Resulting [facility]            Assessment and Plan     Diagnoses and all orders for this visit:    1. Generalized anxiety disorder (Primary)  Assessment & Plan:  Psychological condition is  unchanged.  Discussed GeneSight results in length.  Decrease Lexapro to 10 mg daily for the next 2 weeks, then start Viibryd 20 mg daily.  Discussed Effexor 75 mg extended release daily if Viibryd not approved per insurance.  Medication changes per orders.  Psychological condition  will be reassessed in 3 months.    Orders:  -     vilazodone (VIIBRYD) 20 MG tablet tablet; Take 1 tablet by mouth Daily.  Dispense: 30 tablet; Refill: 2    2. Mild intermittent asthma without complication  Assessment & Plan:  Asthma is improving with treatment.  Continue Singulair 10 mg nightly.  Continue daily antihistamine.  Continue albuterol as needed.  The patient is experiencing no daytime asthma symptoms and she is experiencing no nighttime asthma symptoms.    Plan: Continue same medication/s without change.    Discussed medication dosage, use, side effects, and goals of treatment in detail.    Discussed monitoring symptoms and use of quick-relief medications and contacting provider early in the course of exacerbations.    Patient Treatment Goals: symptom prevention.    Followup in 3 months.              3. Elevated blood pressure reading in office without diagnosis of hypertension  Comments:  Will monitor.  Advise checking home blood pressure and keeping a record.  Blood pressure check next appointment.             Follow Up     Return for follow up 2-3 months for anxiety/med check .  Patient was given instructions and counseling regarding her condition or for health maintenance advice. Please see specific information pulled into the AVS if appropriate.

## 2024-05-03 NOTE — ASSESSMENT & PLAN NOTE
Psychological condition is unchanged.  Discussed GeneSight results in length.  Decrease Lexapro to 10 mg daily for the next 2 weeks, then start Viibryd 20 mg daily.  Discussed Effexor 75 mg extended release daily if Viibryd not approved per insurance.  Medication changes per orders.  Psychological condition  will be reassessed in 3 months.

## 2024-05-08 PROBLEM — Z12.11 ENCOUNTER FOR SCREENING COLONOSCOPY: Status: ACTIVE | Noted: 2024-04-26

## 2024-05-08 RX ORDER — SODIUM, POTASSIUM,MAG SULFATES 17.5-3.13G
SOLUTION, RECONSTITUTED, ORAL ORAL
Qty: 177 ML | Refills: 0 | Status: SHIPPED | OUTPATIENT
Start: 2024-05-08

## 2024-06-14 DIAGNOSIS — R06.2 WHEEZING: ICD-10-CM

## 2024-06-14 RX ORDER — MONTELUKAST SODIUM 10 MG/1
10 TABLET ORAL NIGHTLY
Qty: 90 TABLET | Refills: 3 | Status: SHIPPED | OUTPATIENT
Start: 2024-06-14

## 2024-06-17 DIAGNOSIS — F41.1 GENERALIZED ANXIETY DISORDER: Primary | ICD-10-CM

## 2024-06-17 RX ORDER — VILAZODONE HYDROCHLORIDE 20 MG/1
20 TABLET ORAL DAILY
Qty: 10 TABLET | Refills: 0 | Status: SHIPPED | OUTPATIENT
Start: 2024-06-17

## 2024-08-02 ENCOUNTER — OFFICE VISIT (OUTPATIENT)
Dept: FAMILY MEDICINE CLINIC | Facility: CLINIC | Age: 49
End: 2024-08-02
Payer: COMMERCIAL

## 2024-08-02 VITALS
TEMPERATURE: 96.9 F | OXYGEN SATURATION: 99 % | DIASTOLIC BLOOD PRESSURE: 90 MMHG | WEIGHT: 188.8 LBS | RESPIRATION RATE: 18 BRPM | HEART RATE: 81 BPM | HEIGHT: 61 IN | SYSTOLIC BLOOD PRESSURE: 138 MMHG | BODY MASS INDEX: 35.65 KG/M2

## 2024-08-02 DIAGNOSIS — J45.20 MILD INTERMITTENT ASTHMA WITHOUT COMPLICATION: ICD-10-CM

## 2024-08-02 DIAGNOSIS — Z12.11 COLON CANCER SCREENING: ICD-10-CM

## 2024-08-02 DIAGNOSIS — F41.1 GENERALIZED ANXIETY DISORDER: Primary | ICD-10-CM

## 2024-08-02 DIAGNOSIS — R03.0 ELEVATED BLOOD PRESSURE READING IN OFFICE WITHOUT DIAGNOSIS OF HYPERTENSION: ICD-10-CM

## 2024-08-02 PROCEDURE — 99214 OFFICE O/P EST MOD 30 MIN: CPT | Performed by: NURSE PRACTITIONER

## 2024-08-02 RX ORDER — PROPRANOLOL HYDROCHLORIDE 10 MG/1
10 TABLET ORAL 3 TIMES DAILY
Qty: 90 TABLET | Refills: 0 | Status: SHIPPED | OUTPATIENT
Start: 2024-08-02

## 2024-08-02 RX ORDER — VILAZODONE HYDROCHLORIDE 40 MG/1
40 TABLET ORAL DAILY
Qty: 90 TABLET | Refills: 0 | Status: SHIPPED | OUTPATIENT
Start: 2024-08-02

## 2024-08-02 NOTE — ASSESSMENT & PLAN NOTE
Continue Singulair 10 mg at bedtime.  Recommended starting daily antihistamine over-the-counter such as Zyrtec or Xyzal when seasons change.  Albuterol as needed.  Follow-up as needed.

## 2024-08-02 NOTE — PROGRESS NOTES
"Chief Complaint  Follow-up (3 month )    Subjective        Chika Valente presents to [facility]  History of Present Illness    Patient presents today for follow-up on anxiety, asthma, and elevated blood pressure.  Colonoscopy not completed on June 19; could not tolerate prep. No family hx of colon cancer. BP at home 130s/80s.     Anxiety: Status mild improvement. Current medication includes Viibryd 20 mg daily. Wakes up daily with anxiety. Sometimes feels nauseated; then emotional. Menstrual cycles 23 days instead of 28 days apart (still having monthly cycles). Heart racing at times.  Negative for depressive symptoms, suicidal ideations homicidal ideations.    Asthma: Status is controlled.  Medication includes Singulair 10 mg at bedtime.  No antihistamine on board right now.  Use of albuterol inhaler -none.  Negative for cough, nighttime cough, wheezing, shortness of breath.      Objective   Vital Signs:  /90 (BP Location: Left arm, Patient Position: Sitting, Cuff Size: Adult)   Pulse 81   Temp 96.9 °F (36.1 °C)   Resp 18   Ht 154.9 cm (61\")   Wt 85.6 kg (188 lb 12.8 oz)   SpO2 99%   BMI 35.67 kg/m²   Estimated body mass index is 35.67 kg/m² as calculated from the following:    Height as of this encounter: 154.9 cm (61\").    Weight as of this encounter: 85.6 kg (188 lb 12.8 oz).             Physical Exam  Constitutional:       General: She is not in acute distress.     Appearance: She is well-groomed. She is obese.   Cardiovascular:      Rate and Rhythm: Normal rate and regular rhythm.      Chest Wall: PMI is not displaced.      Heart sounds: Normal heart sounds, S1 normal and S2 normal.   Pulmonary:      Effort: Pulmonary effort is normal.      Breath sounds: Normal breath sounds and air entry.   Musculoskeletal:      Right lower leg: No edema.      Left lower leg: No edema.   Skin:     General: Skin is warm and dry.   Neurological:      Mental Status: She is alert and " oriented to person, place, and time.      Gait: Gait is intact.   Psychiatric:         Attention and Perception: Attention normal.         Mood and Affect: Mood and affect normal.         Speech: Speech normal.         Behavior: Behavior normal.         Thought Content: Thought content normal. Thought content does not include homicidal or suicidal ideation.         Judgment: Judgment normal.        Result Review :      CMP          4/26/2024    08:09   CMP   Glucose 92    BUN 9    Creatinine 0.83    EGFR 87.1    Sodium 140    Potassium 4.3    Chloride 105    Calcium 9.0    Total Protein 7.3    Albumin 4.2    Globulin 3.1    Total Bilirubin 0.5    Alkaline Phosphatase 109    AST (SGOT) 12    ALT (SGPT) 12    Albumin/Globulin Ratio 1.4    BUN/Creatinine Ratio 10.8    Anion Gap 9.0      CBC w/diff          4/26/2024    08:09   CBC w/Diff   WBC 7.82    RBC 4.48    Hemoglobin 10.7    Hematocrit 35.4    MCV 79.0    MCH 23.9    MCHC 30.2    RDW 15.3    Platelets 342    Neutrophil Rel % 55.4    Immature Granulocyte Rel % 0.3    Lymphocyte Rel % 33.5    Monocyte Rel % 7.0    Eosinophil Rel % 2.8    Basophil Rel % 1.0      Lipid Panel          4/26/2024    08:09   Lipid Panel   Total Cholesterol 189    Triglycerides 42    HDL Cholesterol 67    VLDL Cholesterol 8    LDL Cholesterol  114    LDL/HDL Ratio 1.70      TSH          4/26/2024    08:09   TSH   TSH 3.620      Most Recent A1C          4/26/2024    08:09   HGBA1C Most Recent   Hemoglobin A1C 5.10        UA          4/26/2024    08:09   Urinalysis   Squamous Epithelial Cells, UA 0-2    Specific Gravity, UA 1.020    Ketones, UA Negative    Blood, UA Negative    Leukocytes, UA Trace    Nitrite, UA Negative    RBC, UA 0-2    WBC, UA 0-2    Bacteria, UA 3+                     Assessment and Plan     Diagnoses and all orders for this visit:    1. Generalized anxiety disorder (Primary)  Assessment & Plan:  Psychological condition is improving with treatment.  Increase Viibryd  to 40 mg daily.  Start propranolol 10 mg (1-2 tabs) 3 times a day as needed.   Medication changes per orders.  Psychological condition  will be reassessed in 3 months.    Orders:  -     vilazodone (VIIBRYD) 40 MG tablet tablet; Take 1 tablet by mouth Daily.  Dispense: 90 tablet; Refill: 0  -     propranolol (INDERAL) 10 MG tablet; Take 1 tablet by mouth 3 (Three) Times a Day.  Dispense: 90 tablet; Refill: 0    2. Mild intermittent asthma without complication  Assessment & Plan:  Continue Singulair 10 mg at bedtime.  Recommended starting daily antihistamine over-the-counter such as Zyrtec or Xyzal when seasons change.  Albuterol as needed.  Follow-up as needed.            3. Colon cancer screening  -     Cologuard - Stool, Per Rectum; Future    4. Elevated blood pressure reading in office without diagnosis of hypertension  Comments:  Continue to monitor home blood pressures and bring record to next visit.             Follow Up     Return in about 3 months (around 11/2/2024) for follow up anxiety, blood pressure.  Patient was given instructions and counseling regarding her condition or for health maintenance advice. Please see specific information pulled into the AVS if appropriate.         Answers submitted by the patient for this visit:  Other (Submitted on 7/29/2024)  Please describe your symptoms.: anxiety, heart palpitations, fatigue  Have you had these symptoms before?: Yes  How long have you been having these symptoms?: Greater than 2 weeks  Please list any medications you are currently taking for this condition.: vilazodone  Please describe any probable cause for these symptoms. : medication needs to be increased  Primary Reason for Visit (Submitted on 7/29/2024)  What is the primary reason for your visit?: Other

## 2024-08-02 NOTE — ASSESSMENT & PLAN NOTE
Psychological condition is improving with treatment.  Increase Viibryd to 40 mg daily.  Start propranolol 10 mg (1-2 tabs) 3 times a day as needed.   Medication changes per orders.  Psychological condition  will be reassessed in 3 months.

## 2024-09-30 DIAGNOSIS — K21.9 GASTROESOPHAGEAL REFLUX DISEASE WITHOUT ESOPHAGITIS: ICD-10-CM

## 2024-10-22 ENCOUNTER — OFFICE VISIT (OUTPATIENT)
Dept: FAMILY MEDICINE CLINIC | Facility: CLINIC | Age: 49
End: 2024-10-22
Payer: COMMERCIAL

## 2024-10-22 VITALS
TEMPERATURE: 96.3 F | DIASTOLIC BLOOD PRESSURE: 90 MMHG | SYSTOLIC BLOOD PRESSURE: 130 MMHG | HEART RATE: 91 BPM | WEIGHT: 188.3 LBS | RESPIRATION RATE: 18 BRPM | HEIGHT: 61 IN | BODY MASS INDEX: 35.55 KG/M2 | OXYGEN SATURATION: 98 %

## 2024-10-22 DIAGNOSIS — J45.20 MILD INTERMITTENT ASTHMA WITHOUT COMPLICATION: ICD-10-CM

## 2024-10-22 DIAGNOSIS — Z23 NEEDS FLU SHOT: ICD-10-CM

## 2024-10-22 DIAGNOSIS — K21.9 GASTROESOPHAGEAL REFLUX DISEASE WITHOUT ESOPHAGITIS: ICD-10-CM

## 2024-10-22 DIAGNOSIS — R03.0 ELEVATED BLOOD PRESSURE READING IN OFFICE WITHOUT DIAGNOSIS OF HYPERTENSION: ICD-10-CM

## 2024-10-22 DIAGNOSIS — F41.1 GENERALIZED ANXIETY DISORDER: Primary | ICD-10-CM

## 2024-10-22 PROCEDURE — 99214 OFFICE O/P EST MOD 30 MIN: CPT | Performed by: NURSE PRACTITIONER

## 2024-10-22 PROCEDURE — 90471 IMMUNIZATION ADMIN: CPT | Performed by: NURSE PRACTITIONER

## 2024-10-22 PROCEDURE — 90656 IIV3 VACC NO PRSV 0.5 ML IM: CPT | Performed by: NURSE PRACTITIONER

## 2024-10-22 RX ORDER — VILAZODONE HYDROCHLORIDE 40 MG/1
40 TABLET ORAL DAILY
Qty: 90 TABLET | Refills: 1 | Status: SHIPPED | OUTPATIENT
Start: 2024-10-22

## 2024-10-22 NOTE — ASSESSMENT & PLAN NOTE
Asthma is stable.  The patient is experiencing no daytime asthma symptoms and she is experiencing no nighttime asthma symptoms.    Plan: Continue same medication/s without change.    Discussed medication dosage, use, side effects, and goals of treatment in detail.    Discussed distinction between quick-relief and maintenance control medications.  Discussed monitoring symptoms and use of quick-relief medications and contacting provider early in the course of exacerbations.    Patient Treatment Goals: symptom prevention.    Followup in 6 months.

## 2024-10-22 NOTE — PROGRESS NOTES
Injection  Injection performed in left deltoid by Michael Hernandez MA. Patient tolerated the procedure well without complications.  10/22/24   Michael Hernandez MA

## 2024-10-22 NOTE — PROGRESS NOTES
"Chief Complaint  Follow-up    Subjective        Chika Vaelnte presents to [facility]  History of Present Illness    Patient presents today to follow-up on anxiety and elevated blood pressure.     Anxiety: Status is 80% improved since March 2024. Current medication includes Viibryd 40 mg daily and propranolol 10 mg 3 times a day as needed. Some palpitations after eating which occur 3x/week (negative fatigue, dizziness, or syncope). Quit individual therapy recently; did not feel like any additional benefit.  Recent argument with sister and tolerated. Child went to concert and was able to manage emotions. Cousin coming to visit; not in a panic about having company; prior to medication therapy, would have had a \"head to toe severe cleaning.\" Activity same; hiking; recently cut down blue spruce. Negative for any depressive symptoms.     Elevated blood pressure: Home BP readings 130/80's.     Asthma: Controlled. Current medication includes Singulair 10 mg daily and albuterol as needed. Negative for cough, wheezing, and shortness of breath.     GERD: Controlled. Current medication includes omeprazole 20 mg daily. Negative for heartburn, nausea, vomiting, swallowing problem, and abdominal pain.         Objective   Vital Signs:  /90 (BP Location: Right arm, Patient Position: Sitting, Cuff Size: Adult)   Pulse 91   Temp 96.3 °F (35.7 °C) (Temporal)   Resp 18   Ht 154.9 cm (61\")   Wt 85.4 kg (188 lb 4.8 oz)   SpO2 98%   BMI 35.58 kg/m²   Estimated body mass index is 35.58 kg/m² as calculated from the following:    Height as of this encounter: 154.9 cm (61\").    Weight as of this encounter: 85.4 kg (188 lb 4.8 oz).          Physical Exam  Constitutional:       General: She is not in acute distress.     Comments: Pleasant, converses appropriately    HENT:      Head: Normocephalic and atraumatic.      Right Ear: Tympanic membrane, ear canal and external ear normal.      Left Ear: " Tympanic membrane, ear canal and external ear normal.      Nose: Nose normal.      Mouth/Throat:      Lips: Pink.      Mouth: Mucous membranes are moist.      Pharynx: Oropharynx is clear.   Eyes:      Conjunctiva/sclera: Conjunctivae normal.   Cardiovascular:      Rate and Rhythm: Normal rate and regular rhythm.      Chest Wall: PMI is not displaced.      Pulses: Normal pulses.      Heart sounds: Normal heart sounds, S1 normal and S2 normal.   Pulmonary:      Effort: Pulmonary effort is normal.      Breath sounds: Normal breath sounds.   Abdominal:      General: Bowel sounds are normal.      Palpations: Abdomen is soft.      Tenderness: There is no abdominal tenderness.   Musculoskeletal:         General: Normal range of motion.      Cervical back: Neck supple.      Right lower leg: No edema.      Left lower leg: No edema.   Skin:     General: Skin is warm and dry.   Neurological:      Mental Status: She is alert and oriented to person, place, and time.      Gait: Gait is intact.   Psychiatric:         Attention and Perception: Attention and perception normal.         Mood and Affect: Mood and affect normal.         Speech: Speech normal.         Behavior: Behavior normal. Behavior is cooperative.         Thought Content: Thought content normal.         Cognition and Memory: Memory normal.         Judgment: Judgment normal.        Result Review :    CMP          4/26/2024    08:09   CMP   Glucose 92    BUN 9    Creatinine 0.83    EGFR 87.1    Sodium 140    Potassium 4.3    Chloride 105    Calcium 9.0    Total Protein 7.3    Albumin 4.2    Globulin 3.1    Total Bilirubin 0.5    Alkaline Phosphatase 109    AST (SGOT) 12    ALT (SGPT) 12    Albumin/Globulin Ratio 1.4    BUN/Creatinine Ratio 10.8    Anion Gap 9.0      CBC w/diff          4/26/2024    08:09   CBC w/Diff   WBC 7.82    RBC 4.48    Hemoglobin 10.7    Hematocrit 35.4    MCV 79.0    MCH 23.9    MCHC 30.2    RDW 15.3    Platelets 342    Neutrophil Rel % 55.4     Immature Granulocyte Rel % 0.3    Lymphocyte Rel % 33.5    Monocyte Rel % 7.0    Eosinophil Rel % 2.8    Basophil Rel % 1.0      Lipid Panel          4/26/2024    08:09   Lipid Panel   Total Cholesterol 189    Triglycerides 42    HDL Cholesterol 67    VLDL Cholesterol 8    LDL Cholesterol  114    LDL/HDL Ratio 1.70      TSH          4/26/2024    08:09   TSH   TSH 3.620      Most Recent A1C          4/26/2024    08:09   HGBA1C Most Recent   Hemoglobin A1C 5.10                Assessment and Plan   Diagnoses and all orders for this visit:    1. Generalized anxiety disorder (Primary)  -     vilazodone (VIIBRYD) 40 MG tablet tablet; Take 1 tablet by mouth Daily.  Dispense: 90 tablet; Refill: 1    2. Needs flu shot  -     Fluzone >6mos    3. Mild intermittent asthma without complication  Assessment & Plan:  Asthma is stable.  The patient is experiencing no daytime asthma symptoms and she is experiencing no nighttime asthma symptoms.    Plan: Continue same medication/s without change.    Discussed medication dosage, use, side effects, and goals of treatment in detail.    Discussed distinction between quick-relief and maintenance control medications.  Discussed monitoring symptoms and use of quick-relief medications and contacting provider early in the course of exacerbations.    Patient Treatment Goals: symptom prevention.    Followup in 6 months.              4. Gastroesophageal reflux disease without esophagitis  Assessment & Plan:  Discussed s/s of reflux.  Continue omeprazole 20 mg daily.   Follow up 6 months.       5. Elevated blood pressure reading in office without diagnosis of hypertension  Comments:  Advised continuing to monitor at home and keep record. Follow up next visit.    Discussed taking propranolol 10 mg routinely. Will get EKG/holter if palpitations persist. Advised sending update through [employer].          Follow Up   Return in about 6 months (around 4/22/2025) for Annual physical.  Patient was given  instructions and counseling regarding her condition or for health maintenance advice. Please see specific information pulled into the AVS if appropriate.

## 2024-11-19 DIAGNOSIS — F41.1 GENERALIZED ANXIETY DISORDER: ICD-10-CM

## 2024-11-19 RX ORDER — PROPRANOLOL HYDROCHLORIDE 10 MG/1
10 TABLET ORAL 3 TIMES DAILY
Qty: 90 TABLET | Refills: 11 | Status: SHIPPED | OUTPATIENT
Start: 2024-11-19

## 2025-04-14 DIAGNOSIS — F41.1 GENERALIZED ANXIETY DISORDER: ICD-10-CM

## 2025-04-14 RX ORDER — PROPRANOLOL HYDROCHLORIDE 10 MG/1
10 TABLET ORAL 3 TIMES DAILY
Qty: 90 TABLET | Refills: 11 | Status: SHIPPED | OUTPATIENT
Start: 2025-04-14 | End: 2025-04-15 | Stop reason: SDUPTHER

## 2025-04-15 DIAGNOSIS — F41.1 GENERALIZED ANXIETY DISORDER: ICD-10-CM

## 2025-04-15 RX ORDER — PROPRANOLOL HYDROCHLORIDE 10 MG/1
10 TABLET ORAL 3 TIMES DAILY
Qty: 270 TABLET | Refills: 0 | Status: SHIPPED | OUTPATIENT
Start: 2025-04-15

## 2025-04-22 ENCOUNTER — OFFICE VISIT (OUTPATIENT)
Dept: FAMILY MEDICINE CLINIC | Facility: CLINIC | Age: 50
End: 2025-04-22
Payer: COMMERCIAL

## 2025-04-22 ENCOUNTER — LAB (OUTPATIENT)
Dept: FAMILY MEDICINE CLINIC | Facility: CLINIC | Age: 50
End: 2025-04-22
Payer: COMMERCIAL

## 2025-04-22 VITALS
BODY MASS INDEX: 36.57 KG/M2 | OXYGEN SATURATION: 98 % | DIASTOLIC BLOOD PRESSURE: 90 MMHG | WEIGHT: 193.7 LBS | HEART RATE: 79 BPM | TEMPERATURE: 98.2 F | RESPIRATION RATE: 18 BRPM | SYSTOLIC BLOOD PRESSURE: 140 MMHG | HEIGHT: 61 IN

## 2025-04-22 DIAGNOSIS — F41.1 GENERALIZED ANXIETY DISORDER: ICD-10-CM

## 2025-04-22 DIAGNOSIS — Z71.85 VACCINE COUNSELING: ICD-10-CM

## 2025-04-22 DIAGNOSIS — I10 HYPERTENSION, UNSPECIFIED TYPE: ICD-10-CM

## 2025-04-22 DIAGNOSIS — J45.20 MILD INTERMITTENT ASTHMA WITHOUT COMPLICATION: ICD-10-CM

## 2025-04-22 DIAGNOSIS — Z00.00 ANNUAL PHYSICAL EXAM: ICD-10-CM

## 2025-04-22 DIAGNOSIS — Z00.00 ANNUAL PHYSICAL EXAM: Primary | ICD-10-CM

## 2025-04-22 DIAGNOSIS — K21.9 GASTROESOPHAGEAL REFLUX DISEASE WITHOUT ESOPHAGITIS: ICD-10-CM

## 2025-04-22 LAB
25(OH)D3 SERPL-MCNC: 30.9 NG/ML (ref 30–100)
ALBUMIN SERPL-MCNC: 4 G/DL (ref 3.5–5.2)
ALBUMIN/GLOB SERPL: 1.1 G/DL
ALP SERPL-CCNC: 126 U/L (ref 39–117)
ALT SERPL W P-5'-P-CCNC: 11 U/L (ref 1–33)
ANION GAP SERPL CALCULATED.3IONS-SCNC: 7.9 MMOL/L (ref 5–15)
ANISOCYTOSIS BLD QL: NORMAL
AST SERPL-CCNC: 16 U/L (ref 1–32)
BACTERIA UR QL AUTO: ABNORMAL /HPF
BASOPHILS # BLD MANUAL: 0.08 10*3/MM3 (ref 0–0.2)
BASOPHILS NFR BLD MANUAL: 1 % (ref 0–1.5)
BILIRUB SERPL-MCNC: 0.4 MG/DL (ref 0–1.2)
BILIRUB UR QL STRIP: NEGATIVE
BUN SERPL-MCNC: 9 MG/DL (ref 6–20)
BUN/CREAT SERPL: 9.8 (ref 7–25)
CALCIUM SPEC-SCNC: 9.2 MG/DL (ref 8.6–10.5)
CHLORIDE SERPL-SCNC: 103 MMOL/L (ref 98–107)
CHOLEST SERPL-MCNC: 186 MG/DL (ref 0–200)
CLARITY UR: CLEAR
CO2 SERPL-SCNC: 26.1 MMOL/L (ref 22–29)
COLOR UR: YELLOW
CREAT SERPL-MCNC: 0.92 MG/DL (ref 0.57–1)
DEPRECATED RDW RBC AUTO: 44.9 FL (ref 37–54)
EGFRCR SERPLBLD CKD-EPI 2021: 76.5 ML/MIN/1.73
EOSINOPHIL # BLD MANUAL: 0.15 10*3/MM3 (ref 0–0.4)
EOSINOPHIL NFR BLD MANUAL: 2 % (ref 0.3–6.2)
ERYTHROCYTE [DISTWIDTH] IN BLOOD BY AUTOMATED COUNT: 17.1 % (ref 12.3–15.4)
FERRITIN SERPL-MCNC: 8.94 NG/ML (ref 13–150)
FOLATE SERPL-MCNC: 14.6 NG/ML (ref 4.78–24.2)
GLOBULIN UR ELPH-MCNC: 3.5 GM/DL
GLUCOSE SERPL-MCNC: 95 MG/DL (ref 65–99)
GLUCOSE UR STRIP-MCNC: NEGATIVE MG/DL
HBA1C MFR BLD: 5.4 % (ref 4.8–5.6)
HCT VFR BLD AUTO: 32.7 % (ref 34–46.6)
HDLC SERPL-MCNC: 74 MG/DL (ref 40–60)
HGB BLD-MCNC: 9.4 G/DL (ref 12–15.9)
HGB UR QL STRIP.AUTO: NEGATIVE
HOLD SPECIMEN: NORMAL
HYALINE CASTS UR QL AUTO: ABNORMAL /LPF
IRON 24H UR-MRATE: 27 MCG/DL (ref 37–145)
IRON SATN MFR SERPL: 5 % (ref 20–50)
KETONES UR QL STRIP: NEGATIVE
LDLC SERPL CALC-MCNC: 104 MG/DL (ref 0–100)
LDLC/HDLC SERPL: 1.41 {RATIO}
LEUKOCYTE ESTERASE UR QL STRIP.AUTO: ABNORMAL
LYMPHOCYTES # BLD MANUAL: 1.96 10*3/MM3 (ref 0.7–3.1)
LYMPHOCYTES NFR BLD MANUAL: 6.1 % (ref 5–12)
MCH RBC QN AUTO: 21.2 PG (ref 26.6–33)
MCHC RBC AUTO-ENTMCNC: 28.7 G/DL (ref 31.5–35.7)
MCV RBC AUTO: 73.6 FL (ref 79–97)
MICROCYTES BLD QL: NORMAL
MONOCYTES # BLD: 0.47 10*3/MM3 (ref 0.1–0.9)
NEUTROPHILS # BLD AUTO: 5.02 10*3/MM3 (ref 1.7–7)
NEUTROPHILS NFR BLD MANUAL: 65.3 % (ref 42.7–76)
NITRITE UR QL STRIP: NEGATIVE
PH UR STRIP.AUTO: 7 [PH] (ref 5–8)
PLAT MORPH BLD: NORMAL
PLATELET # BLD AUTO: 353 10*3/MM3 (ref 140–450)
PMV BLD AUTO: 9.8 FL (ref 6–12)
POIKILOCYTOSIS BLD QL SMEAR: NORMAL
POTASSIUM SERPL-SCNC: 3.9 MMOL/L (ref 3.5–5.2)
PROT SERPL-MCNC: 7.5 G/DL (ref 6–8.5)
PROT UR QL STRIP: NEGATIVE
RBC # BLD AUTO: 4.44 10*6/MM3 (ref 3.77–5.28)
RBC # UR STRIP: ABNORMAL /HPF
REF LAB TEST METHOD: ABNORMAL
SODIUM SERPL-SCNC: 137 MMOL/L (ref 136–145)
SP GR UR STRIP: 1.01 (ref 1–1.03)
SQUAMOUS #/AREA URNS HPF: ABNORMAL /HPF
TIBC SERPL-MCNC: 504 MCG/DL (ref 298–536)
TRANSFERRIN SERPL-MCNC: 338 MG/DL (ref 200–360)
TRIGL SERPL-MCNC: 37 MG/DL (ref 0–150)
TSH SERPL DL<=0.05 MIU/L-ACNC: 1.96 UIU/ML (ref 0.27–4.2)
UROBILINOGEN UR QL STRIP: ABNORMAL
VARIANT LYMPHS NFR BLD MANUAL: 25.5 % (ref 19.6–45.3)
VIT B12 BLD-MCNC: 470 PG/ML (ref 211–946)
VLDLC SERPL-MCNC: 8 MG/DL (ref 5–40)
WBC # UR STRIP: ABNORMAL /HPF
WBC MORPH BLD: NORMAL
WBC NRBC COR # BLD AUTO: 7.69 10*3/MM3 (ref 3.4–10.8)
YEAST URNS QL MICRO: PRESENT /HPF

## 2025-04-22 PROCEDURE — 82728 ASSAY OF FERRITIN: CPT | Performed by: NURSE PRACTITIONER

## 2025-04-22 PROCEDURE — 81001 URINALYSIS AUTO W/SCOPE: CPT | Performed by: NURSE PRACTITIONER

## 2025-04-22 PROCEDURE — 82607 VITAMIN B-12: CPT | Performed by: NURSE PRACTITIONER

## 2025-04-22 PROCEDURE — 80050 GENERAL HEALTH PANEL: CPT | Performed by: NURSE PRACTITIONER

## 2025-04-22 PROCEDURE — 84466 ASSAY OF TRANSFERRIN: CPT | Performed by: NURSE PRACTITIONER

## 2025-04-22 PROCEDURE — 82746 ASSAY OF FOLIC ACID SERUM: CPT | Performed by: NURSE PRACTITIONER

## 2025-04-22 PROCEDURE — 83036 HEMOGLOBIN GLYCOSYLATED A1C: CPT | Performed by: NURSE PRACTITIONER

## 2025-04-22 PROCEDURE — 80061 LIPID PANEL: CPT | Performed by: NURSE PRACTITIONER

## 2025-04-22 PROCEDURE — 85007 BL SMEAR W/DIFF WBC COUNT: CPT | Performed by: NURSE PRACTITIONER

## 2025-04-22 PROCEDURE — 83540 ASSAY OF IRON: CPT | Performed by: NURSE PRACTITIONER

## 2025-04-22 PROCEDURE — 82306 VITAMIN D 25 HYDROXY: CPT | Performed by: NURSE PRACTITIONER

## 2025-04-22 PROCEDURE — 87086 URINE CULTURE/COLONY COUNT: CPT | Performed by: NURSE PRACTITIONER

## 2025-04-22 RX ORDER — LISINOPRIL 10 MG/1
10 TABLET ORAL DAILY
Qty: 90 TABLET | Refills: 0 | Status: SHIPPED | OUTPATIENT
Start: 2025-04-22

## 2025-04-22 RX ORDER — LORATADINE 10 MG/1
10 TABLET ORAL DAILY
COMMUNITY

## 2025-04-22 RX ORDER — PROGESTERONE 100 MG/1
100 CAPSULE ORAL DAILY
COMMUNITY
Start: 2025-04-21

## 2025-04-22 NOTE — PROGRESS NOTES
Injection  Injection performed in left deltoid by Michael Hernandez MA. Patient tolerated the procedure well without complications.  04/22/25   Michael Hernandez MA

## 2025-04-22 NOTE — PROGRESS NOTES
"Chief Complaint  Annual Exam    Subjective        Chika Valente presents to [facility]  History of Present Illness    Patient presents today for annual physical exam, follow-up on anxiety, asthma, GERD, and elevated blood pressure.    Anxiety: Worse. Current medication includes Viibryd 40 mg daily. Restarted individual therapy; seeing Dr. Caren Guajardo. Completing homework assignments. Visit with Dr. Butler last week; started progesterone 100 mg daily one day ago.  Sleep is good.  Feels tearful.  Decreased concentration.  Feels slow. Lack of appetite.  Negative for any suicidal or homicidal ideations.    Asthma: Controlled.  Medication includes Singulair 10 mg at bedtime, loratadine 10 mg, and albuterol as needed (1x/week). Wheezing occasionally. No nighttime cough.  Negative for any shortness of breath.    GERD: Controlled. Current medication includes omeprazole 20 mg daily. Negative for heartburn, nausea, vomiting, swallowing problem, and abdominal pain.        Elevated blood pressure reading: Current medication includes propranolol 10 mg 3 times daily.  No BP at home. Palpitations at times- resolved with propranolol.  Negative for headache, chest pain, dizziness, diaphoresis, lower extremity swelling.          Review of systems  Constitutional: Positive weight gain and fatigue (\"running out of steam \"). Negative for fever, chills, and trouble sleeping    Skin: Negative for itching, rash, growth/lesions, dryness, change in hair or nails, lumps, abnormal mole, color change, and moisture    Eyes: Negative for visual disturbance and dry eyes    HEENT: Negative for hoarseness, nosebleeds, post nasal drainage, snoring, sneezing, vertigo, sore throat, dry mouth, dental/oral cavity problem, nasal discharge, sinus pain, earache, tinnitus, vertigo, and difficult hearing    Respiratory: see HPI. Negative for shortness of breath, apnea, cough, sputum, and wheezing     Breast: To complete " mammogram per GYN provider.  Negative for lumps or nipple discharge    Cardiovascular: see HPI. Negative for chest pain, palpitations, dizziness, fainting, and edema    Gastrointestinal: see HPI. Cologuard completed August 16, 2024.  Negative for nausea, vomiting, heartburn, change in appetite, swallowing problem, abdominal pain, constipation, diarrhea, black, blood, or mucous in stool, and change in stool caliber    Genitourinary: Pap completed last week.  Possible fibroids; ultrasound scheduled soon.  Negative for frequency, urgency, hesitancy, dysuria, incontinence, hematuria, hernia, vaginal discharge, vaginal dryness, itching, abnormal bleeding, dyspareunia, and pelvic pain     Musculoskeletal: Negative for loss of strength, restricted movement, joint pain, joint swelling, and muscle pain    Neurological: Negative for headache, dizziness, numbness, weakness, tingling/paresthesia, memory change/loss, loss of consciousness, speech disturbance, tremor, unsteady gait, and restless legs    Hematologic/Lymphatic/Immunologic: Negative for easy bleeding, easy bruising, lymph node enlargement, and recurrent infection     Endocrine: Negative for polyphagia, polydipsia, polyuria, cold intolerance, heat intolerance, and excessive sweating    Psychiatric: see HPI       PHQ-9 Depression Screening  Little interest or pleasure in doing things? Several days   Feeling down, depressed, or hopeless? Several days   PHQ-2 Total Score 2   Trouble falling or staying asleep, or sleeping too much? Not at all   Feeling tired or having little energy? Nearly every day   Poor appetite or overeating? Nearly every day   Feeling bad about yourself - or that you are a failure or have let yourself or your family down? Nearly every day   Trouble concentrating on things, such as reading the newspaper or watching television? Several days   Moving or speaking so slowly that other people could have noticed? Or the opposite - being so fidgety or  "restless that you have been moving around a lot more than usual? Nearly every day     Thoughts that you would be better off dead, or of hurting yourself in some way? Not at all   PHQ-9 Total Score 15   If you checked off any problems, how difficult have these problems made it for you to do your work, take care of things at home, or get along with other people? Somewhat difficult            Objective   Vital Signs:  /90 (BP Location: Right arm, Patient Position: Sitting, Cuff Size: Adult)   Pulse 79   Temp 98.2 °F (36.8 °C) (Temporal)   Resp 18   Ht 154.9 cm (61\")   Wt 87.9 kg (193 lb 11.2 oz)   SpO2 98%   BMI 36.60 kg/m²   Estimated body mass index is 36.6 kg/m² as calculated from the following:    Height as of this encounter: 154.9 cm (61\").    Weight as of this encounter: 87.9 kg (193 lb 11.2 oz).          Physical Exam  Vitals and nursing note reviewed.   Constitutional:       General: She is not in acute distress.     Appearance: She is well-developed and well-groomed.      Comments: Pleasant, converses appropriately     HENT:      Head: Normocephalic and atraumatic.      Right Ear: Tympanic membrane, ear canal and external ear normal.      Left Ear: Tympanic membrane, ear canal and external ear normal.      Nose: Nose normal.      Mouth/Throat:      Lips: Pink.      Mouth: Mucous membranes are moist.      Pharynx: Oropharynx is clear.   Eyes:      Conjunctiva/sclera: Conjunctivae normal.   Neck:      Thyroid: No thyromegaly.   Cardiovascular:      Rate and Rhythm: Normal rate and regular rhythm.      Chest Wall: PMI is not displaced.      Pulses: Normal pulses.      Heart sounds: Normal heart sounds, S1 normal and S2 normal.   Pulmonary:      Effort: Pulmonary effort is normal.      Breath sounds: Normal breath sounds.   Abdominal:      General: Bowel sounds are normal.      Palpations: Abdomen is soft.      Tenderness: There is no abdominal tenderness.   Musculoskeletal:         General: Normal " range of motion.      Cervical back: Normal range of motion and neck supple.      Right lower leg: No edema.      Left lower leg: No edema.      Comments: Bilateral upper and lower extremity strength normal.   Lymphadenopathy:      Cervical: No cervical adenopathy.      Upper Body:      Right upper body: No supraclavicular adenopathy.      Left upper body: No supraclavicular adenopathy.   Skin:     General: Skin is warm and dry.      Findings: No rash.   Neurological:      Mental Status: She is alert and oriented to person, place, and time.      Cranial Nerves: Cranial nerves 2-12 are intact.      Motor: Motor function is intact.      Gait: Gait is intact.   Psychiatric:         Attention and Perception: Attention normal.         Mood and Affect: Mood and affect normal.         Speech: Speech normal.         Behavior: Behavior normal.         Thought Content: Thought content normal.         Cognition and Memory: Memory normal.         Judgment: Judgment normal.      Comments: Dressed appropriately.         Result Review :                Assessment and Plan   Diagnoses and all orders for this visit:    1. Annual physical exam (Primary)  Assessment & Plan:  Discussed injury prevention, diet and exercise, safe sexual practices, and screening for common diseases. Encouraged use of sunscreen and seatbelts. Discussed timing of breast cancer screening, cervical cancer screening, colon cancer screening, and review of skin for lesions. Avoidance of tobacco encouraged. Limitation or avoidance of alcohol encouraged. Recommend yearly eye and dental exams. Also discussed monitoring of blood pressure and lipids.       Orders:  -     CBC & Differential; Future  -     Comprehensive Metabolic Panel; Future  -     Lipid Panel; Future  -     Urinalysis With Culture If Indicated -; Future  -     TSH Rfx On Abnormal To Free T4; Future  -     Hemoglobin A1c; Future  -     Vitamin D,25-Hydroxy; Future  -     Iron Profile  -      Ferritin  -     Folate  -     Vitamin B12    2. Generalized anxiety disorder  Assessment & Plan:  Psychological condition is worsening.  Possibly hormonal.  No medication changes for now.  Continue Viibryd 40 mg daily.  Continue propranolol 10 mg 1-2 tabs 3 times daily as needed.  Call for refill on 20 mg prior to next visit if needed.  Continue current treatment regimen.  Follow-up with individual therapy as directed.  Psychological condition  will be reassessed in 3 months.      3. Mild intermittent asthma without complication  Assessment & Plan:  Can continue Claritin 10 mg daily.  Continue Singulair 10 mg at bedtime.  Albuterol as needed.  Follow-up 1 year or sooner if needed.          Orders:  -     CBC & Differential; Future    4. Gastroesophageal reflux disease without esophagitis  Assessment & Plan:  Continue omeprazole 20 mg daily.  Follow-up 1 year or sooner if needed.    Orders:  -     CBC & Differential; Future  -     Comprehensive Metabolic Panel; Future    5. Vaccine counseling  -     Tdap Vaccine => 6yo IM (BOOSTRIX/ADACEL)    6. Hypertension, unspecified type  Assessment & Plan:  Patient has new onset Hypertension  Start lisinopril 10 mg daily.  Ambulatory BP monitoring  Medication changes per orders.  Recommended strategies for weight loss.  Counseled on importance of healthy eating and regular aerobic exercise  Blood pressure will be reassessed in 3 months.    Orders:  -     Comprehensive Metabolic Panel; Future  -     Urinalysis With Culture If Indicated -; Future  -     lisinopril (PRINIVIL,ZESTRIL) 10 MG tablet; Take 1 tablet by mouth Daily.  Dispense: 90 tablet; Refill: 0             Follow Up   Return in about 3 months (around 7/22/2025).  Patient was given instructions and counseling regarding her condition or for health maintenance advice. Please see specific information pulled into the AVS if appropriate.

## 2025-04-23 ENCOUNTER — RESULTS FOLLOW-UP (OUTPATIENT)
Dept: FAMILY MEDICINE CLINIC | Facility: CLINIC | Age: 50
End: 2025-04-23
Payer: COMMERCIAL

## 2025-04-23 DIAGNOSIS — R79.89 LOW VITAMIN D LEVEL: Primary | ICD-10-CM

## 2025-04-23 DIAGNOSIS — D50.0 IRON DEFICIENCY ANEMIA DUE TO CHRONIC BLOOD LOSS: Primary | ICD-10-CM

## 2025-04-23 LAB — BACTERIA SPEC AEROBE CULT: NORMAL

## 2025-04-23 RX ORDER — FERROUS SULFATE 325(65) MG
325 TABLET ORAL
Qty: 90 TABLET | Refills: 0 | Status: SHIPPED | OUTPATIENT
Start: 2025-04-23

## 2025-04-23 RX ORDER — CHOLECALCIFEROL (VITAMIN D3) 1250 MCG
1 TABLET ORAL WEEKLY
Qty: 8 TABLET | Refills: 0 | Status: SHIPPED | OUTPATIENT
Start: 2025-04-23

## 2025-04-23 NOTE — ASSESSMENT & PLAN NOTE
Patient has new onset Hypertension  Start lisinopril 10 mg daily.  Ambulatory BP monitoring  Medication changes per orders.  Recommended strategies for weight loss.  Counseled on importance of healthy eating and regular aerobic exercise  Blood pressure will be reassessed in 3 months.

## 2025-04-23 NOTE — ASSESSMENT & PLAN NOTE
Can continue Claritin 10 mg daily.  Continue Singulair 10 mg at bedtime.  Albuterol as needed.  Follow-up 1 year or sooner if needed.

## 2025-04-23 NOTE — ASSESSMENT & PLAN NOTE
Psychological condition is worsening.  Possibly hormonal.  No medication changes for now.  Continue Viibryd 40 mg daily.  Continue propranolol 10 mg 1-2 tabs 3 times daily as needed.  Call for refill on 20 mg prior to next visit if needed.  Continue current treatment regimen.  Follow-up with individual therapy as directed.  Psychological condition  will be reassessed in 3 months.

## 2025-05-07 DIAGNOSIS — F41.1 GENERALIZED ANXIETY DISORDER: ICD-10-CM

## 2025-05-07 RX ORDER — VILAZODONE HYDROCHLORIDE 40 MG/1
40 TABLET ORAL DAILY
Qty: 90 TABLET | Refills: 3 | Status: SHIPPED | OUTPATIENT
Start: 2025-05-07

## 2025-05-12 DIAGNOSIS — I10 HYPERTENSION, UNSPECIFIED TYPE: ICD-10-CM

## 2025-05-12 RX ORDER — LISINOPRIL 10 MG/1
10 TABLET ORAL DAILY
Qty: 90 TABLET | Refills: 0 | Status: SHIPPED | OUTPATIENT
Start: 2025-05-12

## 2025-05-23 DIAGNOSIS — R06.2 WHEEZING: ICD-10-CM

## 2025-05-24 RX ORDER — MONTELUKAST SODIUM 10 MG/1
10 TABLET ORAL NIGHTLY
Qty: 90 TABLET | Refills: 3 | Status: SHIPPED | OUTPATIENT
Start: 2025-05-24

## 2025-06-04 DIAGNOSIS — K21.9 GASTROESOPHAGEAL REFLUX DISEASE WITHOUT ESOPHAGITIS: ICD-10-CM

## 2025-06-04 RX ORDER — OMEPRAZOLE 20 MG/1
20 CAPSULE, DELAYED RELEASE ORAL DAILY
Qty: 90 CAPSULE | Refills: 3 | Status: SHIPPED | OUTPATIENT
Start: 2025-06-04

## 2025-07-15 ENCOUNTER — LAB (OUTPATIENT)
Dept: FAMILY MEDICINE CLINIC | Facility: CLINIC | Age: 50
End: 2025-07-15
Payer: COMMERCIAL

## 2025-07-15 DIAGNOSIS — D50.0 IRON DEFICIENCY ANEMIA DUE TO CHRONIC BLOOD LOSS: ICD-10-CM

## 2025-07-15 LAB
BASOPHILS # BLD AUTO: 0.07 10*3/MM3 (ref 0–0.2)
BASOPHILS NFR BLD AUTO: 0.8 % (ref 0–1.5)
DEPRECATED RDW RBC AUTO: 52.8 FL (ref 37–54)
EOSINOPHIL # BLD AUTO: 0.28 10*3/MM3 (ref 0–0.4)
EOSINOPHIL NFR BLD AUTO: 3.3 % (ref 0.3–6.2)
ERYTHROCYTE [DISTWIDTH] IN BLOOD BY AUTOMATED COUNT: 17.7 % (ref 12.3–15.4)
FERRITIN SERPL-MCNC: 24.9 NG/ML (ref 13–150)
HCT VFR BLD AUTO: 36.7 % (ref 34–46.6)
HGB BLD-MCNC: 11.3 G/DL (ref 12–15.9)
IMM GRANULOCYTES # BLD AUTO: 0.03 10*3/MM3 (ref 0–0.05)
IMM GRANULOCYTES NFR BLD AUTO: 0.4 % (ref 0–0.5)
LYMPHOCYTES # BLD AUTO: 2.54 10*3/MM3 (ref 0.7–3.1)
LYMPHOCYTES NFR BLD AUTO: 29.7 % (ref 19.6–45.3)
MCH RBC QN AUTO: 25.4 PG (ref 26.6–33)
MCHC RBC AUTO-ENTMCNC: 30.8 G/DL (ref 31.5–35.7)
MCV RBC AUTO: 82.5 FL (ref 79–97)
MONOCYTES # BLD AUTO: 0.56 10*3/MM3 (ref 0.1–0.9)
MONOCYTES NFR BLD AUTO: 6.5 % (ref 5–12)
NEUTROPHILS NFR BLD AUTO: 5.07 10*3/MM3 (ref 1.7–7)
NEUTROPHILS NFR BLD AUTO: 59.3 % (ref 42.7–76)
NRBC BLD AUTO-RTO: 0 /100 WBC (ref 0–0.2)
PLATELET # BLD AUTO: 268 10*3/MM3 (ref 140–450)
PMV BLD AUTO: 9.7 FL (ref 6–12)
RBC # BLD AUTO: 4.45 10*6/MM3 (ref 3.77–5.28)
WBC NRBC COR # BLD AUTO: 8.55 10*3/MM3 (ref 3.4–10.8)

## 2025-07-15 PROCEDURE — 85025 COMPLETE CBC W/AUTO DIFF WBC: CPT | Performed by: NURSE PRACTITIONER

## 2025-07-15 PROCEDURE — 82728 ASSAY OF FERRITIN: CPT | Performed by: NURSE PRACTITIONER

## 2025-07-16 ENCOUNTER — RESULTS FOLLOW-UP (OUTPATIENT)
Dept: FAMILY MEDICINE CLINIC | Facility: CLINIC | Age: 50
End: 2025-07-16
Payer: COMMERCIAL

## 2025-07-22 ENCOUNTER — OFFICE VISIT (OUTPATIENT)
Dept: FAMILY MEDICINE CLINIC | Facility: CLINIC | Age: 50
End: 2025-07-22
Payer: COMMERCIAL

## 2025-07-22 VITALS
OXYGEN SATURATION: 99 % | DIASTOLIC BLOOD PRESSURE: 88 MMHG | SYSTOLIC BLOOD PRESSURE: 128 MMHG | HEIGHT: 61 IN | TEMPERATURE: 98.2 F | HEART RATE: 79 BPM | BODY MASS INDEX: 36.29 KG/M2 | RESPIRATION RATE: 18 BRPM | WEIGHT: 192.2 LBS

## 2025-07-22 DIAGNOSIS — D50.0 IRON DEFICIENCY ANEMIA DUE TO CHRONIC BLOOD LOSS: ICD-10-CM

## 2025-07-22 DIAGNOSIS — F41.1 GENERALIZED ANXIETY DISORDER: ICD-10-CM

## 2025-07-22 DIAGNOSIS — I10 HYPERTENSION, UNSPECIFIED TYPE: Primary | ICD-10-CM

## 2025-07-22 DIAGNOSIS — G43.009 MIGRAINE WITHOUT AURA AND WITHOUT STATUS MIGRAINOSUS, NOT INTRACTABLE: ICD-10-CM

## 2025-07-22 PROCEDURE — 99214 OFFICE O/P EST MOD 30 MIN: CPT | Performed by: NURSE PRACTITIONER

## 2025-07-22 RX ORDER — LISINOPRIL 20 MG/1
20 TABLET ORAL DAILY
Qty: 90 TABLET | Refills: 0 | Status: SHIPPED | OUTPATIENT
Start: 2025-07-22

## 2025-07-22 RX ORDER — RIZATRIPTAN BENZOATE 5 MG/1
5 TABLET ORAL ONCE AS NEEDED
Qty: 12 TABLET | Refills: 0 | Status: SHIPPED | OUTPATIENT
Start: 2025-07-22

## 2025-07-22 RX ORDER — FERROUS SULFATE 325(65) MG
325 TABLET ORAL
Qty: 90 TABLET | Refills: 0 | Status: SHIPPED | OUTPATIENT
Start: 2025-07-22

## 2025-07-22 RX ORDER — ONDANSETRON 4 MG/1
4 TABLET, FILM COATED ORAL EVERY 8 HOURS PRN
Qty: 30 TABLET | Refills: 0 | Status: SHIPPED | OUTPATIENT
Start: 2025-07-22

## 2025-07-22 NOTE — PROGRESS NOTES
Chief Complaint  Hypertension and Anxiety (Follow up )    Subjective        Chika Valente presents to [facility]  History of Present Illness    Patient presents today to follow-up on anxiety, hypertension, iron deficiency.    History of Present Illness    She is currently on lisinopril 10 mg for blood pressure management but does not monitor her blood pressure at home. She reports no fluid retention in her ankles and maintains a high water intake. She has not taken any medication today. She reports no shortness of breath or chest pain.    Her menstrual cycles have been consistent, sometimes with increased amount of bleeding.  She reports no episodes of syncope or dizziness. She has been taking ferrous sulfate 325 mg daily without any constipation issues, as she also takes magnesium at night. She has incorporated vitamin C into her diet.     Her mood has improved significantly. She is no longer crying excessively and feels her concentration has improved. She is sleeping better and has more energy. She is still taking Viibryd 40 mg daily and propranolol 10 mg twice daily. She has not felt the need to increase her propranolol dosage. She is no longer seeing her therapist.      She has been experiencing headaches, which she believes are related to her menstrual cycle. She rarely experiences migraines, but had two episodes in the past month that were so severe they caused vomiting.  The headaches typically last for one day, followed by two days of recovery. One episode of headache lasted for up to 3 days and finally resolved. The pain is located in the right frontal and ocular regions. These headaches occur every six months, but she had two in the same month. The quality of the headaches was similar to previous ones, but they were more intense. She experienced nausea and found that motion and smell aggravated her symptoms.  She did not experience any numbness, weakness, vision loss, or  "dizziness. She also did not have any neck pain. She managed the headaches by resting and sleeping. She had her eyes checked this year. She has never taken any medication for her migraines other than over-the-counter Advil. She has not tried any triptans. She did not consume coffee the day before her last headache. She does not have any medication at home for nausea.     She saw dermatology and was told to come back in a year. She goes to the dentist on Thursday.    Coffee/Tea/Caffeine-containing Drinks: She did not consume coffee the day before her last headache.  Sleep: She reports sleeping better.    GYNECOLOGICAL HISTORY:  - Menstrual Pain: Reports headaches related to menstrual cycle    FAMILY HISTORY  Her mother, grandmother, and sister have a history of migraines.        Objective   Vital Signs:  /88 (BP Location: Left arm, Patient Position: Sitting, Cuff Size: Adult)   Pulse 79   Temp 98.2 °F (36.8 °C) (Temporal)   Resp 18   Ht 154.9 cm (61\")   Wt 87.2 kg (192 lb 3.2 oz)   SpO2 99%   BMI 36.32 kg/m²   Estimated body mass index is 36.32 kg/m² as calculated from the following:    Height as of this encounter: 154.9 cm (61\").    Weight as of this encounter: 87.2 kg (192 lb 3.2 oz).            Physical Exam  Constitutional:       General: She is not in acute distress.     Appearance: She is well-developed and well-groomed. She is obese.   Cardiovascular:      Rate and Rhythm: Normal rate and regular rhythm.      Heart sounds: Normal heart sounds, S1 normal and S2 normal.   Pulmonary:      Effort: Pulmonary effort is normal.      Breath sounds: Normal breath sounds and air entry.   Musculoskeletal:      Right lower leg: No edema.      Left lower leg: No edema.   Skin:     General: Skin is warm and dry.   Neurological:      General: No focal deficit present.      Mental Status: She is alert and oriented to person, place, and time.      Cranial Nerves: Cranial nerves 2-12 are intact.      Motor: Motor " function is intact.      Gait: Gait is intact.   Psychiatric:         Attention and Perception: Attention and perception normal.         Mood and Affect: Mood and affect normal.         Speech: Speech normal.         Behavior: Behavior normal.         Thought Content: Thought content normal. Thought content does not include homicidal or suicidal ideation.         Cognition and Memory: Memory normal.         Judgment: Judgment normal.        Result Review :    CMP          4/22/2025    09:44   CMP   Glucose 95    BUN 9    Creatinine 0.92    EGFR 76.5    Sodium 137    Potassium 3.9    Chloride 103    Calcium 9.2    Total Protein 7.5    Albumin 4.0    Globulin 3.5    Total Bilirubin 0.4    Alkaline Phosphatase 126    AST (SGOT) 16    ALT (SGPT) 11    Albumin/Globulin Ratio 1.1    BUN/Creatinine Ratio 9.8    Anion Gap 7.9      CBC          4/22/2025    09:44 7/15/2025    08:40   CBC   WBC 7.69  8.55    RBC 4.44  4.45    Hemoglobin 9.4  11.3    Hematocrit 32.7  36.7    MCV 73.6  82.5    MCH 21.2  25.4    MCHC 28.7  30.8    RDW 17.1  17.7    Platelets 353  268      CBC w/diff          4/22/2025    09:44 7/15/2025    08:40   CBC w/Diff   WBC 7.69  8.55    RBC 4.44  4.45    Hemoglobin 9.4  11.3    Hematocrit 32.7  36.7    MCV 73.6  82.5    MCH 21.2  25.4    MCHC 28.7  30.8    RDW 17.1  17.7    Platelets 353  268    Neutrophil Rel %  59.3    Immature Granulocyte Rel %  0.4    Lymphocyte Rel %  29.7    Monocyte Rel %  6.5    Eosinophil Rel %  3.3    Basophil Rel %  0.8      Lipid Panel          4/22/2025    09:44   Lipid Panel   Total Cholesterol 186    Triglycerides 37    HDL Cholesterol 74    VLDL Cholesterol 8    LDL Cholesterol  104    LDL/HDL Ratio 1.41      TSH          4/22/2025    09:44   TSH   TSH 1.960      BMP          4/22/2025    09:44   BMP   BUN 9    Creatinine 0.92    Sodium 137    Potassium 3.9    Chloride 103    CO2 26.1    Calcium 9.2      Most Recent A1C          4/22/2025    09:44   HGBA1C Most Recent    Hemoglobin A1C 5.40        UA          4/22/2025    09:48   Urinalysis   Squamous Epithelial Cells, UA 3-6    Specific Gravity, UA 1.011    Ketones, UA Negative    Blood, UA Negative    Leukocytes, UA Small (1+)    Nitrite, UA Negative    RBC, UA 0-2    WBC, UA 11-20    Bacteria, UA None Seen      Urine Culture          4/22/2025    09:48   Urine Culture   Urine Culture <10,000 CFU/mL Normal Urogenital Giovanna                Assessment and Plan   Diagnoses and all orders for this visit:    1. Hypertension, unspecified type (Primary)  -     lisinopril (PRINIVIL,ZESTRIL) 20 MG tablet; Take 1 tablet by mouth Daily.  Dispense: 90 tablet; Refill: 0    2. Generalized anxiety disorder    3. Iron deficiency anemia due to chronic blood loss  -     ferrous sulfate 325 (65 FE) MG tablet; Take 1 tablet by mouth Daily With Breakfast.  Dispense: 90 tablet; Refill: 0  -     Ferritin; Future  -     CBC (No Diff); Future    4. Migraine without aura and without status migrainosus, not intractable  -     rizatriptan (MAXALT) 5 MG tablet; Take 1 tablet by mouth 1 (One) Time As Needed for Migraine for up to 1 dose. May repeat dose x 1 after 2 hours if needed  Dispense: 12 tablet; Refill: 0  -     ondansetron (Zofran) 4 MG tablet; Take 1 tablet by mouth Every 8 (Eight) Hours As Needed for Nausea or Vomiting.  Dispense: 30 tablet; Refill: 0      Assessment & Plan  1. Blood pressure management.  - Systolic blood pressure is well-controlled at 128, but diastolic reading needs improvement with a goal in the 70s.  - Physical exam shows systolic at 128; diastolic remains elevated.  - Discussed increasing lisinopril dosage to 20 mg and advised home blood pressure monitoring.  - Lisinopril dosage increased to 20 mg; advised to reduce dosage by half if dizziness or fatigue occurs.    2. Iron deficiency.  - Iron levels have improved, with ferritin increasing from 8 to 24 and hemoglobin rising from 9.4 to 11.3 over the past three months.  -  Physical exam and lab results show improved ferritin and hemoglobin levels.  - Advised to continue taking ferrous sulfate 325 mg daily and to take omeprazole at least two hours apart from the iron supplement.  - CBC ordered to monitor hemoglobin levels concurrently with iron levels in 10/2025.    3. Anxiety.  - Continues to report improved mood, concentration, and energy levels.  - Physical exam and patient report indicate stable condition.  - Discussed current regimen and confirmed effectiveness.  - Continue Viibryd 40 mg daily and propranolol 10 mg twice daily.    4. Migraines.  - Reports two severe migraines in the past month, associated with nausea and sensitivity to smell.  - Physical exam and patient history indicate migraines without aura.  - Advised to maintain a headache diary to identify potential triggers.  - Prescription for rizatriptan 5 mg provided for use during migraine episodes; prescription for Zofran provided to manage nausea associated with migraines.    Follow-up: A follow-up appointment is scheduled in 3 months, with fasting labs to be completed prior to the visit.           Follow Up   Return in about 3 months (around 10/22/2025).  Patient was given instructions and counseling regarding her condition or for health maintenance advice. Please see specific information pulled into the AVS if appropriate.       Patient or patient representative verbalized consent for the use of Ambient Listening during the visit with  ALEXIS Cornell for chart documentation. 7/22/2025  08:48 EDT

## 2025-08-28 DIAGNOSIS — F41.1 GENERALIZED ANXIETY DISORDER: ICD-10-CM

## 2025-08-28 RX ORDER — PROPRANOLOL HYDROCHLORIDE 10 MG/1
10 TABLET ORAL 3 TIMES DAILY
Qty: 270 TABLET | Refills: 3 | Status: SHIPPED | OUTPATIENT
Start: 2025-08-28